# Patient Record
Sex: FEMALE | Race: WHITE | Employment: UNEMPLOYED | URBAN - METROPOLITAN AREA
[De-identification: names, ages, dates, MRNs, and addresses within clinical notes are randomized per-mention and may not be internally consistent; named-entity substitution may affect disease eponyms.]

---

## 2022-09-15 ENCOUNTER — TELEPHONE (OUTPATIENT)
Dept: FAMILY MEDICINE | Facility: CLINIC | Age: 47
End: 2022-09-15

## 2022-09-15 ENCOUNTER — OFFICE VISIT (OUTPATIENT)
Dept: FAMILY MEDICINE | Facility: CLINIC | Age: 47
End: 2022-09-15
Payer: COMMERCIAL

## 2022-09-15 VITALS
DIASTOLIC BLOOD PRESSURE: 92 MMHG | SYSTOLIC BLOOD PRESSURE: 148 MMHG | HEART RATE: 95 BPM | TEMPERATURE: 97.7 F | WEIGHT: 156 LBS | BODY MASS INDEX: 28.71 KG/M2 | OXYGEN SATURATION: 98 % | HEIGHT: 62 IN | RESPIRATION RATE: 14 BRPM

## 2022-09-15 DIAGNOSIS — I10 HYPERTENSION, UNSPECIFIED TYPE: Primary | ICD-10-CM

## 2022-09-15 DIAGNOSIS — Z13.220 SCREENING FOR HYPERLIPIDEMIA: ICD-10-CM

## 2022-09-15 DIAGNOSIS — R05.9 COUGH: ICD-10-CM

## 2022-09-15 LAB
ANION GAP SERPL CALCULATED.3IONS-SCNC: 3 MMOL/L (ref 3–14)
BUN SERPL-MCNC: 13 MG/DL (ref 7–30)
CALCIUM SERPL-MCNC: 9.3 MG/DL (ref 8.5–10.1)
CHLORIDE BLD-SCNC: 109 MMOL/L (ref 94–109)
CHOLEST SERPL-MCNC: 215 MG/DL
CO2 SERPL-SCNC: 29 MMOL/L (ref 20–32)
CREAT SERPL-MCNC: 0.63 MG/DL (ref 0.52–1.04)
ERYTHROCYTE [DISTWIDTH] IN BLOOD BY AUTOMATED COUNT: 12.6 % (ref 10–15)
FASTING STATUS PATIENT QL REPORTED: YES
GFR SERPL CREATININE-BSD FRML MDRD: >90 ML/MIN/1.73M2
GLUCOSE BLD-MCNC: 106 MG/DL (ref 70–99)
HCT VFR BLD AUTO: 41 % (ref 35–47)
HDLC SERPL-MCNC: 44 MG/DL
HGB BLD-MCNC: 13.8 G/DL (ref 11.7–15.7)
LDLC SERPL CALC-MCNC: 142 MG/DL
MCH RBC QN AUTO: 30.8 PG (ref 26.5–33)
MCHC RBC AUTO-ENTMCNC: 33.7 G/DL (ref 31.5–36.5)
MCV RBC AUTO: 92 FL (ref 78–100)
NONHDLC SERPL-MCNC: 171 MG/DL
PLATELET # BLD AUTO: 357 10E3/UL (ref 150–450)
POTASSIUM BLD-SCNC: 4.1 MMOL/L (ref 3.4–5.3)
RBC # BLD AUTO: 4.48 10E6/UL (ref 3.8–5.2)
SODIUM SERPL-SCNC: 141 MMOL/L (ref 133–144)
TRIGL SERPL-MCNC: 146 MG/DL
TSH SERPL DL<=0.005 MIU/L-ACNC: 1.47 MU/L (ref 0.4–4)
WBC # BLD AUTO: 7.1 10E3/UL (ref 4–11)

## 2022-09-15 PROCEDURE — 85027 COMPLETE CBC AUTOMATED: CPT | Performed by: PHYSICIAN ASSISTANT

## 2022-09-15 PROCEDURE — 99204 OFFICE O/P NEW MOD 45 MIN: CPT | Performed by: PHYSICIAN ASSISTANT

## 2022-09-15 PROCEDURE — 36415 COLL VENOUS BLD VENIPUNCTURE: CPT | Performed by: PHYSICIAN ASSISTANT

## 2022-09-15 PROCEDURE — 84443 ASSAY THYROID STIM HORMONE: CPT | Performed by: PHYSICIAN ASSISTANT

## 2022-09-15 PROCEDURE — 80048 BASIC METABOLIC PNL TOTAL CA: CPT | Performed by: PHYSICIAN ASSISTANT

## 2022-09-15 PROCEDURE — 80061 LIPID PANEL: CPT | Performed by: PHYSICIAN ASSISTANT

## 2022-09-15 RX ORDER — GUAIFENESIN 600 MG/1
1200 TABLET, EXTENDED RELEASE ORAL DAILY PRN
Qty: 20 TABLET | Refills: 0 | Status: SHIPPED | OUTPATIENT
Start: 2022-09-15 | End: 2023-10-16

## 2022-09-15 RX ORDER — LISINOPRIL AND HYDROCHLOROTHIAZIDE 20; 25 MG/1; MG/1
TABLET ORAL
Qty: 53 TABLET | Refills: 0 | Status: SHIPPED | OUTPATIENT
Start: 2022-09-15 | End: 2022-09-29 | Stop reason: SINTOL

## 2022-09-15 ASSESSMENT — PAIN SCALES - GENERAL: PAINLEVEL: NO PAIN (0)

## 2022-09-15 NOTE — PATIENT INSTRUCTIONS
Umang Lugo,    Thank you for allowing [facility] to manage your care.    Tera Yoo koj earlev mar michael necristiana txhais lub [place] ([place]), louis alonzoj [phone].  If you need a Vive Nano , please contact us at [phone].    Your blood pressure was high today. Get a blood pressure cuff for use at home. Take and record your blood pressure daily for 2 weeks. If your blood pressure is greater than or equal to 140/90mm Hg more than half of the time, please increase the dose as on the bottle to one tablet daily in 2 weeks. Otherwise, if t is less than 140/90mmHg, please stay on half of a tablet.    If you develop worsening/changing symptoms at any time, please call 911 or go to the emergency department for evaluation.    I ordered some lab work, please go to the laboratory to get your studies.    I sent your prescriptions to your pharmacy.    Please allow 1-2 business days for our office to contact you in regards to your laboratory/radiological studies.  If not done so, I encourage you to login into [employer] ([email or web address] to review your results as well.     Drink 8-10 glasses of fluid daily to stay well-hydrated.    If you have any questions or concerns, please feel free to call us at [phone]    Sincerely,    Jj Borges PA-C    Did you know?      You can schedule a video visit for follow-up appointments as well as future appointments for certain conditions.  Please see the below link.     [email or web address]    If you have not already done so,  I encourage you to sign up for [employer] ([email or web address]).  This will allow you to review your results, securely communicate with a provider, and schedule virtual visits as well.

## 2022-09-15 NOTE — TELEPHONE ENCOUNTER
Reason for Call:  Other     Detailed comments:  Patient wants her iron to be order in her labs today.    Phone Number Patient can be reached at: Home number on file [phone] (home)    Best Time:  ASAP    Can we leave a detailed message on this number? YES    Call taken on 9/15/2022 at 9:59 AM by Sandra Gage

## 2022-09-15 NOTE — LETTER
September 16, 2022      Parish WILY Larsen  [address] [address]        Dear ,    We are writing to inform you of your test results.    Your test results are reassuring, though your cholesterol and glucose are mildly elevated. We do not need to start any medications right now, but please discuss this with your primary when you see them.    Resulted Orders   CBC with platelets   Result Value Ref Range    WBC Count 7.1 4.0 - 11.0 10e3/uL    RBC Count 4.48 3.80 - 5.20 10e6/uL    Hemoglobin 13.8 11.7 - 15.7 g/dL    Hematocrit 41.0 35.0 - 47.0 %    MCV 92 78 - 100 fL    MCH 30.8 26.5 - 33.0 pg    MCHC 33.7 31.5 - 36.5 g/dL    RDW 12.6 10.0 - 15.0 %    Platelet Count 357 150 - 450 10e3/uL   Basic metabolic panel  (Ca, Cl, CO2, Creat, Gluc, K, Na, BUN)   Result Value Ref Range    Sodium 141 133 - 144 mmol/L    Potassium 4.1 3.4 - 5.3 mmol/L    Chloride 109 94 - 109 mmol/L    Carbon Dioxide (CO2) 29 20 - 32 mmol/L    Anion Gap 3 3 - 14 mmol/L    Urea Nitrogen 13 7 - 30 mg/dL    Creatinine 0.63 0.52 - 1.04 mg/dL    Calcium 9.3 8.5 - 10.1 mg/dL    Glucose 106 (H) 70 - 99 mg/dL    GFR Estimate >90 >60 mL/min/1.73m2      Comment:      Effective December 21, 2021 eGFRcr in adults is calculated using the 2021 CKD-EPI creatinine equation which includes age and gender (Timothy huerta al., [facility], DOI: 10.1056/LMDYga0881591)   TSH with free T4 reflex   Result Value Ref Range    TSH 1.47 0.40 - 4.00 mU/L   Lipid panel reflex to direct LDL Non-fasting   Result Value Ref Range    Cholesterol 215 (H) <200 mg/dL    Triglycerides 146 <150 mg/dL    Direct Measure HDL 44 (L) >=50 mg/dL    LDL Cholesterol Calculated 142 (H) <=100 mg/dL    Non HDL Cholesterol 171 (H) <130 mg/dL    Patient Fasting > 8hrs? Yes     Narrative    Cholesterol  Desirable:  <200 mg/dL    Triglycerides  Normal:  Less than 150 mg/dL  Borderline High:  150-199 mg/dL  High:  200-499 mg/dL  Very High:  Greater than or equal to 500 mg/dL    Direct Measure  HDL  Female:  Greater than or equal to 50 mg/dL   Male:  Greater than or equal to 40 mg/dL    LDL Cholesterol  Desirable:  <100mg/dL  Above Desirable:  100-129 mg/dL   Borderline High:  130-159 mg/dL   High:  160-189 mg/dL   Very High:  >= 190 mg/dL    Non HDL Cholesterol  Desirable:  130 mg/dL  Above Desirable:  130-159 mg/dL  Borderline High:  160-189 mg/dL  High:  190-219 mg/dL  Very High:  Greater than or equal to 220 mg/dL       If you have any questions or concerns, please call the clinic at the number listed above.       Sincerely,      KUN Ambriz

## 2022-09-15 NOTE — PROGRESS NOTES
"  Assessment & Plan   Problem List Items Addressed This Visit    None     Visit Diagnoses     Hypertension, unspecified type    -  Primary    Relevant Medications    lisinopril-hydrochlorothiazide (ZESTORETIC) 20-25 MG tablet    Other Relevant Orders    TSH with free T4 reflex    Basic metabolic panel  (Ca, Cl, CO2, Creat, Gluc, K, Na, BUN)    CBC with platelets (Completed)    Home Blood Pressure Monitor Order for DME - ONLY FOR DME    Screening for hyperlipidemia        Relevant Orders    Lipid panel reflex to direct LDL Non-fasting    Cough        Relevant Medications    guaiFENesin (MUCINEX) 600 MG 12 hr tablet         Likely new onset HTN. Low suspicion for end organ damage. Asymptomatic currently. Did not tolerate amlodipine. Will trial low dose Zestoretic with ramp up prn, though second bp today was only mildly elevated. CBC shows no anemia. TSH and chem are pending.    Complete history and physical exam as below. AF with normal VS except for elevated bp, which they will monitor at home and contact us if >140/90mmHg greater than 50% of the time.    DDx and Dx discussed with and explained to the pt to their satisfaction.  All questions were answered at this time. Pt expressed understanding of and agreement with this dx, tx, and plan. No further workup warranted and standard medication warnings given. I have given the patient a list of pertinent indications for re-evaluation. Will go to the Emergency Department if symptoms worsen or new concerning symptoms arise. Patient left in no apparent distress.     Ordering of each unique test  Prescription drug management  45 minutes spent on the date of the encounter doing chart review, history and exam, documentation and further activities per the note     BMI:   Estimated body mass index is 28.89 kg/m  as calculated from the following:    Height as of this encounter: 1.565 m (5' 1.61\").    Weight as of this encounter: 70.8 kg (156 lb).     See Patient " Instructions    Return in about 1 month (around 10/15/2022) for a recheck with your primary care provider, or call 911/go to an ER anytime if worsening.    KUN Ambriz  [facility] [place] Lugo is a 47 year old accompanied by her daughter, presenting for the following health issues:  Hypertension and Hospital F/U      HPI     Hospital Follow-up Visit:  [facility]  used.  Hospital/Nursing Home/IP Rehab Facility: unknown per patient, ER at [place]  Date of Admission: 8/24/22  Date of Discharge: 8/24/22  Reason(s) for Admission: HTN    Was your hospitalization related to COVID-19? No   Problems taking medications regularly:  None  Medication changes since discharge: N/A  Problems adhering to non-medication therapy:  None    Summary of hospitalization:  CareEverywhere information obtained and reviewed    Seen in urgent care on 8/19/22 for dizziness and elevated bp and told to go to ER. Did not go to the ER. Went to a different urgent care on 8/24/22 and had an EKG    3 weeks ago had dizziness and near syncope while driving. Pulled over. Needs a new primary provider. Was told to follow up in primary care and was started on 5mg of amlodipine. This was not helpful. She had difficulty sleeping while on this medication. She continues to feel tightness in her shoulders and occasionally in her chest along with dizziness. Chest discomfort has resolved. Comprehensive panel and EKG were not concerning during the second visit on 8/24/22. Feels jittery and dizziness as if she needs to eat something.     Diagnostic Tests/Treatments reviewed.  Follow up needed: needs to establish primary.  Other Healthcare Providers Involved in Patient s Care:         None  Update since discharge: fluctuating course.     Review of Systems   Constitutional, HEENT, cardiovascular, pulmonary, heme, endo, msk, derm, neuro, psych,  gi and gu systems are negative, except as otherwise noted.     "  Objective    BP (!) 148/92   Pulse 95   Temp 97.7  F (36.5  C) (Tympanic)   Resp 14   Ht 1.565 m (5' 1.61\")   Wt 70.8 kg (156 lb)   SpO2 98%   BMI 28.89 kg/m    Body mass index is 28.89 kg/m .  Physical Exam  Vitals and nursing note reviewed.   Constitutional:       General: She is not in acute distress.     Appearance: She is not ill-appearing or diaphoretic.   HENT:      Head: Normocephalic and atraumatic.      Mouth/Throat:      Mouth: Mucous membranes are moist.   Eyes:      Conjunctiva/sclera: Conjunctivae normal.   Cardiovascular:      Rate and Rhythm: Normal rate and regular rhythm.      Heart sounds: Normal heart sounds. No murmur heard.    No friction rub. No gallop.   Pulmonary:      Effort: Pulmonary effort is normal. No respiratory distress.      Breath sounds: Normal breath sounds. No stridor. No wheezing, rhonchi or rales.   Abdominal:      General: Bowel sounds are normal. There is no distension.      Palpations: Abdomen is soft. There is no mass.      Tenderness: There is no abdominal tenderness. There is no guarding or rebound.      Hernia: No hernia is present.   Skin:     General: Skin is warm and dry.   Neurological:      General: No focal deficit present.      Mental Status: She is alert. Mental status is at baseline.      Comments: Negative finger-nose-finger, heel-to-shin and pronator drift.  Face symmetric.  Speech clear. CN 2-12 intact. Normal strength and sensation in face and upper/lower extremities bilaterally.   Psychiatric:         Mood and Affect: Mood normal.         Behavior: Behavior normal.        Results for orders placed or performed in visit on 09/15/22   CBC with platelets     Status: Normal   Result Value Ref Range    WBC Count 7.1 4.0 - 11.0 10e3/uL    RBC Count 4.48 3.80 - 5.20 10e6/uL    Hemoglobin 13.8 11.7 - 15.7 g/dL    Hematocrit 41.0 35.0 - 47.0 %    MCV 92 78 - 100 fL    MCH 30.8 26.5 - 33.0 pg    MCHC 33.7 31.5 - 36.5 g/dL    RDW 12.6 10.0 - 15.0 %    " Platelet Count 357 150 - 450 10e3/uL

## 2022-09-15 NOTE — LETTER
September 15, 2022      Parish Larsen  [address] [address]        To Whom It May Concern:    Parish Larsen was seen in our clinic. Please excuse her absence.      Sincerely,        KUN Ambriz

## 2022-09-19 ENCOUNTER — TELEPHONE (OUTPATIENT)
Dept: FAMILY MEDICINE | Facility: CLINIC | Age: 47
End: 2022-09-19

## 2022-09-19 NOTE — TELEPHONE ENCOUNTER
Pt sister calling asking about medication.  NO consent to communicate on file.    She reports sister started taking her medication yesterday.  She reports feeling burning hot, and cannot sleep and is tired in the morning.  Sister says pt had same reaction for amlodipine.      They are wondering if she should continue taking or start something different.     (aslo advised establish care visit)    Please call pt after 3 pm    Мария Ayala RN

## 2022-09-19 NOTE — TELEPHONE ENCOUNTER
If she is having a reaction, she should stop the medication and make an appointment to establish care with a primary and discuss other bp meds. If she worsens/changes as far as the reaction is concerned, she should be seen in urgent care/ER. Please update.

## 2022-09-19 NOTE — TELEPHONE ENCOUNTER
Attempted to call patient via [place]  at [phone].  Per  he tried number x 2.  Both times phone went silent.   stated sometimes this happen's with land lines with no voicemail.  Will need to try again tomorrow.   Statement Selected

## 2022-09-20 ENCOUNTER — NURSE TRIAGE (OUTPATIENT)
Dept: NURSING | Facility: CLINIC | Age: 47
End: 2022-09-20

## 2022-09-20 NOTE — TELEPHONE ENCOUNTER
Called [phone] (home) using  services    Did patient answer the phone: No,there was no answer or voicemail.    Luisa RN,BSN  Triage Nurse  [facility]: [facility]

## 2022-09-20 NOTE — TELEPHONE ENCOUNTER
"Nurse Triage SBAR    Situation:   Medication reaction    Background:   Parish is calling in with her son and , It is okay to leave a detailed message at this number.   She  wants her son to act as an  - denied other phone  at this time  - She has given verbal consent to communicate with them.    Assessment:   Parish was seen on 9/15/22 and was prescribed two new medications.  She said the Mucinex is working fine, but the Zestoretic is causing symptoms:  -She feels warm, no fever - they do not have a thermometer  to check her temperature - RN recommended teem to buy one and to call back if she  Has fever)   -Hard to sleep  -No chest pain   -No difficulty breathing  -No rash  -No difficultly swallowing ing or throat swelling  -No lightheadedness or dizziness  -When ever she takes the medication she hears a thumping sound in her ears she dose not know if its her heart beat  -With the pill she develops shoulder and neck pain with taking the medication and pain on her side  -She also gets get shivers form the medications    They called yesterday with similar problems, Per JACKIE Borges:  \"If she is having a reaction, she should stop the medication and make an appointment to establish care with a primary and discuss other bp meds. If she worsens/changes as far as the reaction is concerned, she should be seen in urgent care/ER. Please update.\"    -We discussed when to call back and when to go to the ED.    If you need to call her back it is best to call after 4PM.    Protocol Recommended Disposition:   Home Care    Recommendation:   Follow Jony recommendations and call the FNA back if you have additional questions or concerns.    NABIL PERALTA RN on 9/20/2022 at 4:25 PM      Reason for Disposition    [1] Fever AND [2] no signs of serious infection or localizing symptoms (all other triage questions negative)    Additional Information    Negative: Drug overdose and triager unable to answer " question    Negative: Caller requesting a renewal or refill of a medicine patient is currently taking    Negative: Caller requesting information unrelated to medicine    Negative: Caller requesting information about COVID-19 Vaccine    Negative: Caller requesting information about Emergency Contraception    Negative: Caller requesting information about Combined Birth Control Pills    Negative: Caller requesting information about Progestin Birth Control Pills    Negative: Caller requesting information about Post-Op pain or medicines    Negative: Caller requesting a prescription antibiotic (such as penicillin) for Strep throat and has a positive culture result    Negative: Caller requesting a prescription anti-viral med (such as Tamiflu) and has influenza (flu) symptoms    Negative: Immunization reaction suspected    Negative: Rash while taking a medicine or within 3 days of stopping it    Negative: Symptom of illness (e.g., headache, abdominal pain, earache, vomiting) that are more than mild    Negative: Breastfeeding questions about mother's medicines and diet    Negative: Asthma and having symptoms of asthma (cough, wheezing, etc.)    Negative: New-onset rash with multiple purple (or blood-colored) spots or dots    Negative: Sounds like a life-threatening emergency to the triager    Negative: [1] Difficulty breathing AND [2] bluish lips, tongue or face    Negative: Difficult to awaken or acting confused (e.g., disoriented, slurred speech)    Negative: Pregnant    Negative: Fever in a cancer patient who is currently (or recently) receiving chemotherapy or radiation therapy, or cancer patient who has metastatic or end-stage cancer and is receiving palliative care    Negative: Postpartum (from 0 to 6 weeks after delivery)    Negative: Shock suspected (e.g., cold/pale/clammy skin, too weak to stand, low BP, rapid pulse)    Negative: Other symptom is present, see that guideline (e.g., symptoms of cough, runny nose, sore  throat, earache, abdominal pain, diarrhea, vomiting)    Negative: Fever onset within 24 hours of receiving vaccine    Negative: [1] Fever AND [2] within 14 days of COVID-19 Exposure    Negative: Difficulty breathing    Negative: Widespread rash and cause unknown    Negative: Patient sounds very sick or weak to the triager  (Exception: mild weakness and hasn't taken fever medicine)    Negative: Fever > 104 F (40 C)    Negative: [1] Fever > 101 F (38.3 C) AND [2] age > 60 years    Negative: [1] Fever > 100.0 F (37.8 C) AND [2] bedridden (e.g., nursing home patient, CVA, chronic illness, recovering from surgery)    Negative: [1] Fever > 100.0 F (37.8 C) AND [2] indwelling urinary catheter (e.g., Watkins, Coude)    Negative: [1] Fever > 100.0 F (37.8 C) AND [2] has port (portacath), central line, or PICC line    Negative: [1] Fever > 100.0 F (37.8 C) AND [2] diabetes mellitus or weak immune system (e.g., HIV positive, cancer chemo, splenectomy, organ transplant, chronic steroids)    Negative: [1] Fever > 100.0 F (37.8 C) AND [2] surgery in the last month    Negative: [1] Headache AND [2] stiff neck (can't touch chin to chest)    Negative: IV Drug Use (IVDU)    Negative: [1] Drinking very little AND [2] dehydration suspected (e.g., no urine > 12 hours, very dry mouth, very lightheaded)    Negative: [1] Fever > 100.0 F (37.8 C) AND [2] foreign travel to a developing country in the last month    Negative: Transplant patient (e.g., kidney, liver, lung, heart)    Negative: [1] Intermittent fever > 100.0 F (37.8 C) AND [2] lasts > 3 weeks    Negative: Fever present > 3 days (72 hours)    Negative: Severe chills (i.e., feeling extremely cold WITH shaking chills)    Protocols used: FEVER-A-AH, MEDICATION QUESTION CALL-A-OH

## 2022-09-21 NOTE — TELEPHONE ENCOUNTER
Called [phone].  The message stated that the person you are calling is unavailable, please try your call again later.    Luisa RN,BSN  Triage Nurse  [facility]: [facility]  Ph: [phone]

## 2022-09-26 NOTE — TELEPHONE ENCOUNTER
Called [phone] (home).  The message stated that the wireless caller you are calling is unavailable.  Please try your call again later.    Luisa RN,BSN  Triage Nurse  [facility]: [facility]  Ph: [phone]

## 2022-09-26 NOTE — TELEPHONE ENCOUNTER
She is scheduled to establish care here at [place] in 3 days, med reaction is part of the visit note.    Myriam Wall RN  [facility]

## 2022-09-29 ENCOUNTER — OFFICE VISIT (OUTPATIENT)
Dept: FAMILY MEDICINE | Facility: CLINIC | Age: 47
End: 2022-09-29
Payer: COMMERCIAL

## 2022-09-29 VITALS
BODY MASS INDEX: 27.79 KG/M2 | DIASTOLIC BLOOD PRESSURE: 98 MMHG | TEMPERATURE: 97.2 F | SYSTOLIC BLOOD PRESSURE: 148 MMHG | OXYGEN SATURATION: 97 % | HEART RATE: 96 BPM | WEIGHT: 151 LBS | HEIGHT: 62 IN | RESPIRATION RATE: 16 BRPM

## 2022-09-29 DIAGNOSIS — G44.209 TENSION HEADACHE: ICD-10-CM

## 2022-09-29 DIAGNOSIS — R73.03 PREDIABETES: ICD-10-CM

## 2022-09-29 DIAGNOSIS — R42 DIZZINESS: Primary | ICD-10-CM

## 2022-09-29 DIAGNOSIS — I10 HYPERTENSION GOAL BP (BLOOD PRESSURE) < 140/90: ICD-10-CM

## 2022-09-29 PROCEDURE — 99215 OFFICE O/P EST HI 40 MIN: CPT | Performed by: PHYSICIAN ASSISTANT

## 2022-09-29 RX ORDER — LISINOPRIL 10 MG/1
10 TABLET ORAL DAILY
Qty: 30 TABLET | Refills: 1 | Status: SHIPPED | OUTPATIENT
Start: 2022-09-29 | End: 2022-10-27

## 2022-09-29 RX ORDER — CYCLOBENZAPRINE HCL 10 MG
10 TABLET ORAL 2 TIMES DAILY PRN
Qty: 30 TABLET | Refills: 0 | Status: SHIPPED | OUTPATIENT
Start: 2022-09-29

## 2022-09-29 ASSESSMENT — PAIN SCALES - GENERAL: PAINLEVEL: NO PAIN (0)

## 2022-09-29 NOTE — LETTER
[facility]  [address]  [place] [address]  Phone: [phone]        9/29/2022    Parish Larsen  [address]  [place] [address]            To Whom it May Concern:    This patient missed work 9/29/2022 due to illness.    Please contact me for questions or concerns.    Sincerely,    Sydney Nair PA-C

## 2022-09-29 NOTE — PROGRESS NOTES
Assessment & Plan     Dizziness  Symptoms are relatively new. Initially, I thought symptoms may be from low pressures due to overmedication/med side effects.  However she has been off BP medication for 2 weeks and symptoms still present.  Neurological exam normal today which is reassuring.  [place] Hallpike test negative, therefore unlikely positional vertigo.   Previous labs were normal (stable hemoglobin).   Further evaluation with an MRI indicated.   Symptoms may be part of a stress reaction, however will first check an MRI to ensure nothing more significant is going on as this is a relatively new symptom for her.     - MR Brain w/o & w Contrast; Future    Hypertension goal BP (blood pressure) < 140/90    I do suspect that the two previous meds caused side effects and/or were a little too high of doses for her.  BP is elevated but not currently in a dangerous or urgent range, therefore will start low and slowly taper up.   - Albumin Random Urine Quantitative with Creat Ratio; Future  - lisinopril (ZESTRIL) 10 MG tablet; Take 1 tablet (10 mg) by mouth daily    Prediabetes    We discussed her last blood sugar level, and that this puts her in a pre-diabetic range.  We discussed ways to lower her risk of developing diabetes through diet and exercise, such as eating a healthy diet high in lean protein, whole grain carbohydrates and choosing healthy fats such as olive oil or canola oil.   Will recheck level in 12 months.    Reassure patient this is not contributing to current symptoms.       Tension headache  Tension Headache    Medication per .    Patient was given educational materials on tension headaches.  Patient advised to do ROM exercises 2-3 times per day and apply heat to the neck as needed (patient warned not to sleep with heating pad on).  Follow-up if headache persists despite these treatments, or if symptoms change.     I warned Parish Larsen that flexeril may cause drowsiness and she is not to  "drive or operate heavy machinery after taking this medication.     - cyclobenzaprine (FLEXERIL) 10 MG tablet; Take 1 tablet (10 mg) by mouth 2 times daily as needed for other (tension headache)      50 minutes spent on the date of the encounter doing chart review, history and exam, documentation and further activities per the note       BMI:   .  Estimated body mass index is 27.97 kg/m  as calculated from the following:    Height as of this encounter: 1.565 m (5' 1.61\").    Weight as of this encounter: 68.5 kg (151 lb).           Return in about 4 weeks (around 10/27/2022).    ELLIE Gayle [facility] [place] is a 47 year old accompanied by her sister and , presenting for the following health issues:  Allergic Reaction      HPI     Patient arrived to discuss cocerns with Blood Pressure medication lisinopril-hydrochlorothiazide (ZESTORETIC) 20-25 MG tablet. Patient reports side effects of; drowsiness, headaches that radiate into neck and difficulty sleeping.  Patient no longer taking bp medication (about 2 weeks ago) would like to discuss alternative medication.     **Pt also wanting to know if there is time to fill out [place] paperwork.  She has been missing work due to lightheaded sensation and dizziness.  No LOC.  Sitting helps the dizziness go away.  Occurs a couple times per week.  She works 10 hours per day and she is sitting doing .  She is concerned she may lose her job due to these symptoms.  She does get these symptoms at home as well.  Sometimes will occur 3-4 times per day other times it will come on 2-3 times per week.      **Pt also wanting IUD removed. Believes it is the Mirena form but is unsure, has been around 5 years since placement.     She c/o tension headaches.  Pain stems in the neck and radiates into head. She also c/o headaches and dizziness.     Near end of exam, sister mentioned that there has been more stress in the " "family as a loved one's health is not well.  They wonder if some of these symptoms are related to worry.       Hypertension Follow-up  Burning, head into neck, especially at night.     Do you check your blood pressure regularly outside of the clinic? Yes - Monitoring at home.     Are you following a low salt diet? Yes    Are your blood pressures ever more than 140 on the top number (systolic) OR more   than 90 on the bottom number (diastolic), for example 140/90? Yes, Systolic number averaging around 130-150.       How many servings of fruits and vegetables do you eat daily?  2-3    On average, how many sweetened beverages do you drink each day (Examples: soda, juice, sweet tea, etc.  Do NOT count diet or artificially sweetened beverages)?   0-1    How many days per week do you exercise enough to make your heart beat faster? 3 or less    How many minutes a day do you exercise enough to make your heart beat faster? 9 or less    How many days per week do you miss taking your medication? 0        Review of Systems   Constitutional, HEENT, cardiovascular, pulmonary, GI, , musculoskeletal, neuro, skin, endocrine and psych systems are negative, except as otherwise noted.      Objective    BP (!) 148/98   Pulse 96   Temp 97.2  F (36.2  C) (Tympanic)   Resp 16   Ht 1.565 m (5' 1.61\")   Wt 68.5 kg (151 lb)   SpO2 97%   Breastfeeding No   BMI 27.97 kg/m    Body mass index is 27.97 kg/m .  Physical Exam   GENERAL: healthy, alert and no distress  EYES: Eyes grossly normal to inspection, PERRL and conjunctivae and sclerae normal  HENT: ear canals and TM's normal, nose and mouth without ulcers or lesions  NECK: no adenopathy, no asymmetry, masses, or scars and thyroid normal to palpation  RESP: lungs clear to auscultation - no rales, rhonchi or wheezes  CV: regular rate and rhythm, normal S1 S2, no S3 or S4, no murmur, click or rub, no peripheral edema and peripheral pulses strong  ABDOMEN: soft, nontender, no " hepatosplenomegaly, no masses and bowel sounds normal  MS: no gross musculoskeletal defects noted, no edema    Neurological Examination:  Mental Status:  Alert and oriented to time, place, and person.  Recent and remote memory intact.  Attention span and concentration normal.  Adequate fund of knowledge.  Speech:  Normal  Cranial Nerves:  Cranial Nerve #2: Visual acuity normal to finger counting.  Pupils equal and reacting to light and to accomodation.   Cranial #3, 4, 6:  Eye movements normal in all directions of gaze  Cranial #5:   Motor function normal.  Cranial #7: Face symmetrical in appearance and movement.  Cranial #8: Normal hearing to whispered sounds.  Cranial #9 & 10: Normal palate and uvula movement  Cranial #11:  Shoulder shrug symmetrical  Cranial #12:  Tongue midline with normal movements.  Motor:  Tone:  Normal in both upper and lower limbs.  Bulk:  Normal in both upper and lower limbs  Power: No drift of the outstretched hands.  Normal strength in all muscle groups (5/5) of both upper and lower limbs.  Coordination:   heel-shin test normal bilaterally  Reflexes: Deep tendon reflexes 2+ and symmetrical both sides in upper and lower extremities.  Gait:  Walk with a normal stride length and normal arm swing.  Normal tandem walking.  Can stand/walk on heels and toes.  Romberg sign: Negative.  [place] Hallpike test negative.           No results found for any visits on 09/29/22.

## 2022-09-29 NOTE — PATIENT INSTRUCTIONS
I suggest we start a very low dose lisinopril for blood pressure.     For your tension headache, I suggest you use flexeril (muscle relaxer) as needed.  This may cause drowsiness.    Heat, massage and stretching important.     Please call [facility] at [phone]  to set up the MRI of the brain.

## 2022-10-01 PROBLEM — G44.209 TENSION HEADACHE: Status: ACTIVE | Noted: 2022-10-01

## 2022-10-01 PROBLEM — R73.03 PREDIABETES: Status: ACTIVE | Noted: 2022-10-01

## 2022-10-01 PROBLEM — R42 DIZZINESS: Status: ACTIVE | Noted: 2022-10-01

## 2022-10-01 PROBLEM — I10 HYPERTENSION GOAL BP (BLOOD PRESSURE) < 140/90: Status: ACTIVE | Noted: 2022-10-01

## 2022-10-04 ENCOUNTER — TELEPHONE (OUTPATIENT)
Dept: FAMILY MEDICINE | Facility: CLINIC | Age: 47
End: 2022-10-04

## 2022-10-04 NOTE — TELEPHONE ENCOUNTER
Forms/Letter Request    Type of form/letter: Work. Needs letter stating she cannot work any OT. Fax to ANA Chaya Amita [phone] phone is [phone] email is [email or web address]    Have you been seen for this request: Yes     Do we have the form/letter: No    When is form/letter needed by: asap    How would you like the form/letter returned: Fax    Patient Notified form requests are processed in 3-5 business days:Yes    Okay to leave a detailed message?: Yes at Home number on file [phone] (home) or sister Angelica Levi [phone]

## 2022-10-06 ENCOUNTER — ANCILLARY PROCEDURE (OUTPATIENT)
Dept: MRI IMAGING | Facility: CLINIC | Age: 47
End: 2022-10-06
Attending: PHYSICIAN ASSISTANT
Payer: COMMERCIAL

## 2022-10-06 DIAGNOSIS — R42 DIZZINESS: ICD-10-CM

## 2022-10-06 PROCEDURE — 70553 MRI BRAIN STEM W/O & W/DYE: CPT | Mod: TC | Performed by: RADIOLOGY

## 2022-10-06 PROCEDURE — A9585 GADOBUTROL INJECTION: HCPCS | Mod: JW | Performed by: RADIOLOGY

## 2022-10-06 RX ORDER — GADOBUTROL 604.72 MG/ML
6.8 INJECTION INTRAVENOUS ONCE
Status: COMPLETED | OUTPATIENT
Start: 2022-10-06 | End: 2022-10-06

## 2022-10-06 RX ADMIN — GADOBUTROL 6.8 ML: 604.72 INJECTION INTRAVENOUS at 17:16

## 2022-10-10 ENCOUNTER — TELEPHONE (OUTPATIENT)
Dept: FAMILY MEDICINE | Facility: CLINIC | Age: 47
End: 2022-10-10

## 2022-10-10 NOTE — TELEPHONE ENCOUNTER
Called patient using  services.  There was no answer, or voicemail set up.  Will try again later.    Luisa RN,BSN  Triage Nurse  [facility]: [facility]  Ph: [phone]

## 2022-10-10 NOTE — TELEPHONE ENCOUNTER
----- Message from Sydney Nair PA-C sent at 10/10/2022  7:23 AM CDT -----  Please call with an ,    Her MRI of the brain is normal.  We'll check back in at her follow-up visit on Oct 27th, 2022.     Please follow-up if you have any questions or concerns.    Sincerely,    Sydney Nair PA-C

## 2022-10-11 NOTE — TELEPHONE ENCOUNTER
Called using  [phone] (home) person is not available, can not leave a message.  Called [phone],  The voicemail stated that the person you are calling is not available, please try your call again later.         Please mail the below results and message from PCP.    Luisa RN,BSN  Triage Nurse  [facility]: [facility]

## 2022-10-27 ENCOUNTER — OFFICE VISIT (OUTPATIENT)
Dept: FAMILY MEDICINE | Facility: CLINIC | Age: 47
End: 2022-10-27
Payer: COMMERCIAL

## 2022-10-27 ENCOUNTER — TELEPHONE (OUTPATIENT)
Dept: FAMILY MEDICINE | Facility: CLINIC | Age: 47
End: 2022-10-27

## 2022-10-27 VITALS
RESPIRATION RATE: 16 BRPM | SYSTOLIC BLOOD PRESSURE: 139 MMHG | WEIGHT: 150 LBS | DIASTOLIC BLOOD PRESSURE: 87 MMHG | HEART RATE: 84 BPM | HEIGHT: 62 IN | TEMPERATURE: 97.6 F | BODY MASS INDEX: 27.6 KG/M2 | OXYGEN SATURATION: 99 %

## 2022-10-27 DIAGNOSIS — Z00.00 ROUTINE GENERAL MEDICAL EXAMINATION AT A HEALTH CARE FACILITY: Primary | ICD-10-CM

## 2022-10-27 DIAGNOSIS — E87.6 HYPOKALEMIA: ICD-10-CM

## 2022-10-27 DIAGNOSIS — B37.31 VAGINAL CANDIDIASIS: ICD-10-CM

## 2022-10-27 DIAGNOSIS — F32.9 REACTIVE DEPRESSION (SITUATIONAL): ICD-10-CM

## 2022-10-27 DIAGNOSIS — H53.8 BLURRED VISION: ICD-10-CM

## 2022-10-27 DIAGNOSIS — Z12.31 VISIT FOR SCREENING MAMMOGRAM: ICD-10-CM

## 2022-10-27 DIAGNOSIS — Z12.11 SCREEN FOR COLON CANCER: ICD-10-CM

## 2022-10-27 DIAGNOSIS — Z97.5 IUD (INTRAUTERINE DEVICE) IN PLACE: ICD-10-CM

## 2022-10-27 DIAGNOSIS — I10 HYPERTENSION GOAL BP (BLOOD PRESSURE) < 140/90: ICD-10-CM

## 2022-10-27 DIAGNOSIS — Z12.4 CERVICAL CANCER SCREENING: ICD-10-CM

## 2022-10-27 LAB
CLUE CELLS: ABNORMAL
TRICHOMONAS, WET PREP: ABNORMAL
WBC'S/HIGH POWER FIELD, WET PREP: ABNORMAL
YEAST, WET PREP: PRESENT

## 2022-10-27 PROCEDURE — 83540 ASSAY OF IRON: CPT | Performed by: PHYSICIAN ASSISTANT

## 2022-10-27 PROCEDURE — G0145 SCR C/V CYTO,THINLAYER,RESCR: HCPCS | Performed by: PHYSICIAN ASSISTANT

## 2022-10-27 PROCEDURE — 87210 SMEAR WET MOUNT SALINE/INK: CPT | Performed by: PHYSICIAN ASSISTANT

## 2022-10-27 PROCEDURE — 80048 BASIC METABOLIC PNL TOTAL CA: CPT | Performed by: PHYSICIAN ASSISTANT

## 2022-10-27 PROCEDURE — 83550 IRON BINDING TEST: CPT | Performed by: PHYSICIAN ASSISTANT

## 2022-10-27 PROCEDURE — 36415 COLL VENOUS BLD VENIPUNCTURE: CPT | Performed by: PHYSICIAN ASSISTANT

## 2022-10-27 PROCEDURE — 87624 HPV HI-RISK TYP POOLED RSLT: CPT | Performed by: PHYSICIAN ASSISTANT

## 2022-10-27 PROCEDURE — 99396 PREV VISIT EST AGE 40-64: CPT | Performed by: PHYSICIAN ASSISTANT

## 2022-10-27 PROCEDURE — 82043 UR ALBUMIN QUANTITATIVE: CPT | Performed by: PHYSICIAN ASSISTANT

## 2022-10-27 PROCEDURE — 99213 OFFICE O/P EST LOW 20 MIN: CPT | Mod: 25 | Performed by: PHYSICIAN ASSISTANT

## 2022-10-27 RX ORDER — LISINOPRIL 10 MG/1
10 TABLET ORAL DAILY
Qty: 90 TABLET | Refills: 1 | Status: SHIPPED | OUTPATIENT
Start: 2022-10-27 | End: 2023-07-10

## 2022-10-27 RX ORDER — FLUCONAZOLE 150 MG/1
150 TABLET ORAL ONCE
Qty: 1 TABLET | Refills: 0 | Status: SHIPPED | OUTPATIENT
Start: 2022-10-27 | End: 2022-10-27

## 2022-10-27 NOTE — TELEPHONE ENCOUNTER
----- Message from Sydney Nair PA-C sent at 10/27/2022  2:31 PM CDT -----  Please call Song (with ),   Her wet prep was positive for a yeast infection (which can cause a thick white vaginal discharge).   I have sent a medication called diflucan, take this x 1 dose.   Also, please relay to Song that the IUD she had in place was in fact a 5 year Kyleena (not the Mirena as I expected before her exam).  This should be replaced at the 5 year andrzej, she can schedule this with me soon.    Sydney Nair PA-C

## 2022-10-27 NOTE — LETTER
[facility]  [address] [place] [address]  Phone: [phone]    October 27, 2022        Parish Larsen  [address] [address]          To whom it may concern:    RE: Parish WILY Chava    Patient was seen and treated today at our clinic and missed work.    Please contact me for questions or concerns.      Sincerely,        Sydney Nair PA-C

## 2022-10-27 NOTE — PROGRESS NOTES
SUBJECTIVE:   CC: Parish is an 47 year old who presents with son (Cameron) and  for preventive health visit.       Patient has been advised of split billing requirements and indicates understanding: Yes     History of Present Illness       Hypertension: She presents for follow up of hypertension.  She does check blood pressure  regularly outside of the clinic. Outpatient blood pressures have not been over 140/90. She follows a low salt diet.    She is not taking prescribed medications regularly due to None.  Healthy Habits:    Getting at least 3 servings of Calcium per day:  Yes    Bi-annual eye exam:  NO    Dental care twice a year:  NO    Sleep apnea or symptoms of sleep apnea:  None    Diet:  Other (Avoiding beef)    Frequency of exercise:  None    Duration of exercise:  N/A    Taking medications regularly:  Yes    Barriers to taking medications:  None    Medication side effects:  None    PHQ-2 Total Score:    Additional concerns today:  Yes (Following up with blood pressures and IUD Check)    Patient arrived for Annual Physical; due for PAP and would like IUD checked. Would also like to have labs drawn if possible, not fasting. Also has concerns with symptoms of dizziness and shakiness, pt is prediabetic unsure if related but pt has concerns with vertigo.     Dizziness has resolved.  She has had high stress levels ( has been sick for 2 years).   Still feels a little shaky and she feels some haziness in her vision.     No recent eye exam.  Would like to get this done at the eye specialist her children use.           Hypertension Follow-up      Do you check your blood pressure regularly outside of the clinic? No     Are you following a low salt diet? No    Are your blood pressures ever more than 140 on the top number (systolic) OR more   than 90 on the bottom number (diastolic), for example 140/90? No      Today's PHQ-2 Score:   PHQ-2 ( 1999 [employer]) 9/29/2022   Q1: Little interest or pleasure in  doing things 1   Q2: Feeling down, depressed or hopeless 1   PHQ-2 Score 2   Q1: Little interest or pleasure in doing things Several days   Q2: Feeling down, depressed or hopeless Several days   PHQ-2 Score 2       Abuse: Current or Past (Physical, Sexual or Emotional) - No  Do you feel safe in your environment? Yes    Have you ever done Advance Care Planning? (For example, a Health Directive, POLST, or a discussion with a medical provider or your loved ones about your wishes): No, advance care planning information given to patient to review.  Patient declined advance care planning discussion at this time.    Social History     Tobacco Use     Smoking status: Never     Smokeless tobacco: Never   Substance Use Topics     Alcohol use: Yes         No flowsheet data found.    Reviewed orders with patient.  Reviewed health maintenance and updated orders accordingly - Yes  Labs reviewed in EPIC  BP Readings from Last 3 Encounters:   10/27/22 139/87   09/29/22 (!) 148/98   09/15/22 (!) 148/92    Wt Readings from Last 3 Encounters:   10/27/22 68 kg (150 lb)   09/29/22 68.5 kg (151 lb)   09/15/22 70.8 kg (156 lb)                  Patient Active Problem List   Diagnosis     Hypertension goal BP (blood pressure) < 140/90     Dizziness     Prediabetes     Tension headache     IUD (intrauterine device) in place     Reactive depression (situational)     No past surgical history on file.    Social History     Tobacco Use     Smoking status: Never     Smokeless tobacco: Never   Substance Use Topics     Alcohol use: Yes     Family History   Problem Relation Age of Onset     No Known Problems Mother      No Known Problems Father      No Known Problems Brother      No Known Problems Sister            Breast Cancer Screening:    Breast CA Risk Assessment (FHS-7) 10/27/2022   Do you have a family history of breast, colon, or ovarian cancer? No / Unknown         Mammogram Screening: Recommended annual mammography  Pertinent mammograms  "are reviewed under the imaging tab.    History of abnormal Pap smear: NO - age 30-65 PAP every 5 years with negative HPV co-testing recommended     Reviewed and updated as needed this visit by clinical staff   Tobacco  Allergies               Reviewed and updated as needed this visit by Provider                     Review of Systems  CONSTITUTIONAL: NEGATIVE for fever, chills, change in weight  INTEGUMENTARU/SKIN: NEGATIVE for worrisome rashes, moles or lesions  EYES: NEGATIVE for vision changes or irritation  ENT: NEGATIVE for ear, mouth and throat problems  RESP: NEGATIVE for significant cough or SOB  BREAST: NEGATIVE for masses, tenderness or discharge  CV: NEGATIVE for chest pain, palpitations or peripheral edema  GI: NEGATIVE for nausea, abdominal pain, heartburn, or change in bowel habits  : NEGATIVE for unusual urinary or vaginal symptoms. Periods are regular.  MUSCULOSKELETAL: NEGATIVE for significant arthralgias or myalgia  NEURO: NEGATIVE for weakness, dizziness or paresthesias  PSYCHIATRIC: NEGATIVE for changes in mood or affect     OBJECTIVE:   /87 (BP Location: Left arm, Patient Position: Chair, Cuff Size: Adult Regular)   Pulse 84   Temp 97.6  F (36.4  C) (Tympanic)   Resp 16   Ht 1.565 m (5' 1.61\")   Wt 68 kg (150 lb)   SpO2 99%   BMI 27.78 kg/m    Physical Exam  GENERAL: healthy, alert and no distress  EYES: Eyes grossly normal to inspection, PERRL and conjunctivae and sclerae normal  HENT: ear canals and TM's normal, nose and mouth without ulcers or lesions  NECK: no adenopathy, no asymmetry, masses, or scars and thyroid normal to palpation  RESP: lungs clear to auscultation - no rales, rhonchi or wheezes  BREAST: normal without masses, tenderness or nipple discharge and no palpable axillary masses or adenopathy  CV: regular rate and rhythm, normal S1 S2, no S3 or S4, no murmur, click or rub, no peripheral edema and peripheral pulses strong  ABDOMEN: soft, nontender, no " hepatosplenomegaly, no masses and bowel sounds normal   (female): normal female external genitalia, normal urethral meatus, vaginal mucosa pink, moist, well rugated, and normal cervix/adnexa/uterus without masses, white thick discharge noted.  Blue IUD string visualized at cervical os.   MS: no gross musculoskeletal defects noted, no edema  SKIN: no suspicious lesions or rashes  NEURO: Normal strength and tone, mentation intact and speech normal  PSYCH: mentation appears normal, affect normal/bright    Diagnostic Test Results:  Labs reviewed in Epic  No results found for this or any previous visit (from the past 24 hour(s)).    ASSESSMENT/PLAN:   (Z00.00) Routine general medical examination at a health care facility  (primary encounter diagnosis)  Comment:      HEALTH CARE MAINTENANCE              Reviewed USPTF recommendations and anticipatory guidance.              See orders.   I discussed in detail with Parish Larsen the importance of cervical cancer screenings through pap smears, will repeat pap every 5 year(s).      Tetanus and flu shots advised, she declined.  Patient information given.           (I10) Hypertension goal BP (blood pressure) < 140/90  Comment: BP stable.  Will leave medication as is.  Due to recheck labs.   Plan: Basic metabolic panel  (Ca, Cl, CO2, Creat,         Gluc, K, Na, BUN)            (Z12.31) Visit for screening mammogram  Comment: I discussed in detail with Parish Larsen the importance of breast cancer screening through monthly self breast exams and annual breast exams in the clinic.    Plan: MA SCREENING DIGITAL BILAT - Future  (s+30)            (Z12.11) Screen for colon cancer  Comment: I discussed the importance of colorectal cancer screening, including the risks and benefits of the various procedures, including colonoscopy, cologuard and annual FOB immunoassay test.   Patient would like to consider options and will reach back out.         Plan:     (Z12.4) Cervical cancer  "screening  Comment: I discussed the new pap recommendations regarding screening.  Explained the rationale for increased intervals between paps.  Questions asked and answered.  She does agree to this regimen.     Plan: Pap Screen with HPV - recommended age 30 - 65         years            (Z97.5) IUD (intrauterine device) in place  Comment: IUD in place (string is blue, this is likely Kyleena).  This should be replaced soon (as it has been present for 5 years).   Plan:     (N76.0) Vaginitis and vulvovaginitis  Comment: will check wetprep  Plan: Wet prep - Clinic Collect            (F32.9) Reactive depression (situational)  Comment: Discussed treatment options for depression/anxiety (medication, therapy, lifestyle changes).  She would like to consider this and get back to me.  Discussed that some of her previous symptoms may have been from uncontrolled stress/worry.   Plan:     Blurry vision:  Recommend f/u with optometrist for eye exam.   Patient has been advised of split billing requirements and indicates understanding: Yes      COUNSELING:  Reviewed preventive health counseling, as reflected in patient instructions       Regular exercise       Healthy diet/nutrition    Estimated body mass index is 27.78 kg/m  as calculated from the following:    Height as of this encounter: 1.565 m (5' 1.61\").    Weight as of this encounter: 68 kg (150 lb).        She reports that she has never smoked. She has never used smokeless tobacco.      Counseling Resources:  ATP IV Guidelines  Pooled Cohorts Equation Calculator  Breast Cancer Risk Calculator  BRCA-Related Cancer Risk Assessment: FHS-7 Tool  FRAX Risk Assessment  ICSI Preventive Guidelines  Dietary Guidelines for Americans, 2010  USDA's MyPlate  ASA Prophylaxis  Lung CA Screening    50 minutes spent on the date of the encounter doing chart review, history and exam, documentation and further activities per the note   Sydney Nair PA-C  [facility] " [place]

## 2022-10-27 NOTE — TELEPHONE ENCOUNTER
Called [facility] ; tried both patient phone numbers but no service line set up to leave message.  Patient doesn't have my chart set up.    Routing to  Pool, please send letter to patient with Sydney Nair's result notes regarding yeast infection/tx and follow up appointment, provided in previous message today.  Suly Ramesh RN

## 2022-10-27 NOTE — PATIENT INSTRUCTIONS
Please call [phone] to schedule the mammogram    Screening for colon cancer is now advised starting at age 45.   Colon cancer is the number 2 leading cause of cancer deaths in the [place], and is very treatable if caught in the early stages.  Unfortunately, people often do not have symptoms during the early stages, which is why preventative screening is so important.      The best, most thorough test is a colonoscopy.  The main draw back of this test is the preparation (diarrhea for a couple days prior).   If this is normal and you do not have a family history of colon cancer,  you do not need to repeat for 10 years.   The other option is a cologuard test (where we check your stool for signs of colon cancer).  If this is normal, you should repeat in 3 years. If this test comes back positive, you'll want to follow it up with a diagnostic colonoscopy.     Please let me know if you have had some sort of screening in the past (when, where and the results).  Otherwise, please let me know if you would like me to place an order for a colonoscopy or cologuard test.      Preventive Health Recommendations  Female Ages 40 to 49    Yearly exam:     See your health care provider every year in order to  1. Review health changes.   2. Discuss preventive care.    3. Review your medicines if your doctor prescribed any.      Get a Pap test every three years (unless you have an abnormal result and your provider advises testing more often).      If you get Pap tests with HPV test, you only need to test every 5 years, unless you have an abnormal result. You do not need a Pap test if your uterus was removed (hysterectomy) and you have not had cancer.      You should be tested each year for STDs (sexually transmitted diseases), if you're at risk.     Ask your doctor if you should have a mammogram.      Have a colonoscopy (test for colon cancer) if someone in your family has had colon cancer or polyps before age 50.       Have a  cholesterol test every 5 years.       Have a diabetes test (fasting glucose) after age 45. If you are at risk for diabetes, you should have this test every 3 years.    Shots: Get a flu shot each year. Get a tetanus shot every 10 years.     Nutrition:     Eat at least 5 servings of fruits and vegetables each day.    Eat whole-grain bread, whole-wheat pasta and brown rice instead of white grains and rice.    Get adequate Calcium and Vitamin D.      Lifestyle    Exercise at least 150 minutes a week (an average of 30 minutes a day, 5 days a week). This will help you control your weight and prevent disease.    Limit alcohol to one drink per day.    No smoking.     Wear sunscreen to prevent skin cancer.    See your dentist every six months for an exam and cleaning.

## 2022-10-27 NOTE — LETTER
[facility]  [address]  [place] [address]  Phone: [phone]        10/27/2022    Parish Larsen  [address]  [place] [address]  [phone] (home)     :     1975      To Whom it May Concern:    This patient was seen at our clinic on 10/27/2022 for care.    Please contact me for questions or concerns.    Sincerely,    Sydney Nair PA-C

## 2022-10-28 LAB
ANION GAP SERPL CALCULATED.3IONS-SCNC: 6 MMOL/L (ref 3–14)
BUN SERPL-MCNC: 6 MG/DL (ref 7–30)
CALCIUM SERPL-MCNC: 8.7 MG/DL (ref 8.5–10.1)
CHLORIDE BLD-SCNC: 109 MMOL/L (ref 94–109)
CO2 SERPL-SCNC: 29 MMOL/L (ref 20–32)
CREAT SERPL-MCNC: 0.66 MG/DL (ref 0.52–1.04)
CREAT UR-MCNC: 40 MG/DL
GFR SERPL CREATININE-BSD FRML MDRD: >90 ML/MIN/1.73M2
GLUCOSE BLD-MCNC: 116 MG/DL (ref 70–99)
IRON SATN MFR SERPL: 19 % (ref 15–46)
IRON SERPL-MCNC: 54 UG/DL (ref 35–180)
MICROALBUMIN UR-MCNC: <5 MG/L
MICROALBUMIN/CREAT UR: NORMAL MG/G{CREAT}
POTASSIUM BLD-SCNC: 3 MMOL/L (ref 3.4–5.3)
SODIUM SERPL-SCNC: 144 MMOL/L (ref 133–144)
TIBC SERPL-MCNC: 285 UG/DL (ref 240–430)

## 2022-10-31 ENCOUNTER — TELEPHONE (OUTPATIENT)
Dept: FAMILY MEDICINE | Facility: CLINIC | Age: 47
End: 2022-10-31

## 2022-10-31 LAB
BKR LAB AP GYN ADEQUACY: NORMAL
BKR LAB AP GYN INTERPRETATION: NORMAL
BKR LAB AP HPV REFLEX: NORMAL
BKR LAB AP PREVIOUS ABNORMAL: NORMAL
PATH REPORT.COMMENTS IMP SPEC: NORMAL
PATH REPORT.COMMENTS IMP SPEC: NORMAL
PATH REPORT.RELEVANT HX SPEC: NORMAL

## 2022-10-31 NOTE — TELEPHONE ENCOUNTER
----- Message from Sydney Nair PA-C sent at 10/31/2022  9:02 AM CDT -----  Please call Song (will need an )    Your potassium is low, I recommend you increase your intake of potassium rich foods, such as bananas, spinach, broccoli, asparagus, brussel sprouts, and tomatoes.  Let's recheck this level in 1 month (lab only).    Sugar is a little high in prediabetes range if she was fasting that day (unlikely, was drawn at 2:21 pm).  If she was not fasting, it is normal.     The rest of her labs are normal.     Please follow-up if you have any questions or concerns.    Sincerely,    Sydney Nair PA-C

## 2022-10-31 NOTE — TELEPHONE ENCOUNTER
No consent to communicate on file.    I called   Services, placed call to patient with assistance of [facility] [place]    Left message at home number for call back to [facility] nurse line at [phone].     also tried the mobile number, adult male answered.   Parish is at work, he verified the [phone] number is the best number to reach her at, she works until about 4:30 pm.    Will call again tomorrow if she does not respond.    Myriam Wall RN  [facility]

## 2022-10-31 NOTE — TELEPHONE ENCOUNTER
Son, Cameron, calling.   He is with his mom, requests we call her back with an  now.  Verified phone number is correct.    I called   Services, placed call to patient with assistance of [facility]  #[id number].  Tried the home number twice, went immediately to voicemail.    Tried the mobile number which I believe is the son.    He answered and put Song on the line.       Potassium   Date Value Ref Range Status   10/27/2022 3.0 (L) 3.4 - 5.3 mmol/L Final        Scheduled lab only for 12/2 to check repeat potassium.    She was not fasting for her recent lab draw, she ate lunch at 11 am.    She thinks Sydney wanted her to do another cholesterol test, should she fast for the upcoming lab test?    I see lipid panel was done in September.  Note on result says was fasting.    Routed to PCP, was another lipid panel advised?    Myriam Wall RN  [facility]

## 2022-10-31 NOTE — LETTER
[facility] [place]  [address] [place] [address]  [phone]        November 11, 2022    Parish Larsen  [address]  [place] [address]        Dear Parish Larsen    No we do not need another lipid panel as you had a fasting one done already in Sept. Upcoming lab is non-fasting.         Sincerely,      Sydney Nair PA-C

## 2022-11-01 LAB
HUMAN PAPILLOMA VIRUS 16 DNA: NEGATIVE
HUMAN PAPILLOMA VIRUS 18 DNA: NEGATIVE
HUMAN PAPILLOMA VIRUS FINAL DIAGNOSIS: NORMAL
HUMAN PAPILLOMA VIRUS OTHER HR: NEGATIVE

## 2022-11-01 NOTE — TELEPHONE ENCOUNTER
No we do not need another lipid panel as she had a fasting one done already in Sept.  Upcoming lab is non-fasting.      Sydney Nair PA-C

## 2022-11-01 NOTE — TELEPHONE ENCOUNTER
Per my conversations with patient yesterday, she works M-Th until 4:30 pm, is off on Fridays.  Will postpone call to 4:30 pm for best chance of patient being available for the call.    Myriam Wall RN  [facility]

## 2022-11-01 NOTE — TELEPHONE ENCOUNTER
Called [phone] (home) using [employer]   Did they answer the phone: No, left a message on voicemail to return call to the [facility] at [phone].    Called [phone] (Mobile).  Unable to leave a message, as the voicemail is not set up.  Luisa RN,BSN  Triage Nurse  [facility]: [facility]

## 2022-11-04 NOTE — TELEPHONE ENCOUNTER
I called   Services, placed call to patient with assistance of [facility] [place]    Placed call to mobile number [phone].   No answer, no voicemail set up.  Placed call to home number [phone].   No answer,  left message for call back to [place] nurse line at [phone] for message.    Myriam Wall RN  [facility]

## 2022-11-09 NOTE — TELEPHONE ENCOUNTER
Called with .    Called [phone] (home) using [employer]   Did they answer the phone: No, left a message on voicemail to return call to the [facility] at [phone].    Called [phone] (Mobile).  Unable to leave a message, as the voicemail is not set up.  Luisa RN,BSN  Triage Nurse  [facility]: [facility]

## 2022-11-11 NOTE — TELEPHONE ENCOUNTER
Patient has not called back.  Please mail message below per provider.  Luisa RN,BSN  Triage Nurse  [facility]: [facility]  Ph: [phone]

## 2022-11-28 DIAGNOSIS — I10 HYPERTENSION GOAL BP (BLOOD PRESSURE) < 140/90: ICD-10-CM

## 2022-11-28 RX ORDER — LISINOPRIL 10 MG/1
10 TABLET ORAL DAILY
Qty: 90 TABLET | Refills: 1 | OUTPATIENT
Start: 2022-11-28

## 2022-12-02 ENCOUNTER — LAB (OUTPATIENT)
Dept: LAB | Facility: CLINIC | Age: 47
End: 2022-12-02
Payer: COMMERCIAL

## 2022-12-02 DIAGNOSIS — E87.6 HYPOKALEMIA: ICD-10-CM

## 2022-12-02 DIAGNOSIS — Z11.59 NEED FOR HEPATITIS C SCREENING TEST: ICD-10-CM

## 2022-12-02 DIAGNOSIS — Z11.4 SCREENING FOR HIV (HUMAN IMMUNODEFICIENCY VIRUS): ICD-10-CM

## 2022-12-02 LAB — POTASSIUM BLD-SCNC: 4 MMOL/L (ref 3.4–5.3)

## 2022-12-02 PROCEDURE — 86803 HEPATITIS C AB TEST: CPT

## 2022-12-02 PROCEDURE — 84132 ASSAY OF SERUM POTASSIUM: CPT

## 2022-12-02 PROCEDURE — 87389 HIV-1 AG W/HIV-1&-2 AB AG IA: CPT

## 2022-12-02 PROCEDURE — 36415 COLL VENOUS BLD VENIPUNCTURE: CPT

## 2022-12-03 LAB
HCV AB SERPL QL IA: NONREACTIVE
HIV 1+2 AB+HIV1 P24 AG SERPL QL IA: NONREACTIVE

## 2022-12-05 ENCOUNTER — TELEPHONE (OUTPATIENT)
Dept: FAMILY MEDICINE | Facility: CLINIC | Age: 47
End: 2022-12-05

## 2022-12-05 NOTE — TELEPHONE ENCOUNTER
----- Message from Sydney Nair PA-C sent at 12/5/2022 11:33 AM CST -----  Please call Parish (will need ), her labs are all normal. Potassium back to a normal range . Continue to eat a healthy diet with a good balance of potassium rich foods.     Sydney Nair PA-C

## 2022-12-05 NOTE — TELEPHONE ENCOUNTER
RN left message to return call to clinic [phone]    Maira Granado RN on 12/5/2022 at 3:13 PM

## 2022-12-13 NOTE — TELEPHONE ENCOUNTER
Patient's son, Mg, called regarding message below.  Parish gave verbal consent to share HIPAA with son.      Son verbalized understanding of below message.     Obi Munroe RN

## 2022-12-14 DIAGNOSIS — I10 HYPERTENSION GOAL BP (BLOOD PRESSURE) < 140/90: ICD-10-CM

## 2022-12-15 RX ORDER — LISINOPRIL 10 MG/1
10 TABLET ORAL DAILY
Qty: 90 TABLET | Refills: 1 | OUTPATIENT
Start: 2022-12-15

## 2023-01-03 ENCOUNTER — TELEPHONE (OUTPATIENT)
Dept: FAMILY MEDICINE | Facility: CLINIC | Age: 48
End: 2023-01-03

## 2023-01-03 NOTE — TELEPHONE ENCOUNTER
Spoke to patients son Mg with pt consent . Pts son is requesting a refill of the lisinopril. Pt sons states that he contacted the Pharmacy ([facility] in [place]), and they stated there is no refill on file.    RN calling [facility] to verify ,[facility] confirms [facility] have the script and are going to fill it, will update the son as requested.     Attempted to call son to give him update, he did not answer message left.       Uma Delcid RN  [facility]

## 2023-01-04 NOTE — TELEPHONE ENCOUNTER
Notified Mg.  Luisa RN,BSN  Triage Nurse  [facility]: [facility]  Ph: [phone]

## 2023-01-04 NOTE — TELEPHONE ENCOUNTER
"Called    Mg Larsen (Son) (Child) [phone]       Did they answer the phone: No, unable to leave a message, as the \"person you are calling has a voicemail box, that has not been set up yet\"    Luisa RN,BSN  Triage Nurse  [facility]: [facility]        "

## 2023-07-10 DIAGNOSIS — I10 HYPERTENSION GOAL BP (BLOOD PRESSURE) < 140/90: ICD-10-CM

## 2023-07-10 RX ORDER — LISINOPRIL 10 MG/1
TABLET ORAL
Qty: 90 TABLET | Refills: 1 | Status: SHIPPED | OUTPATIENT
Start: 2023-07-10 | End: 2023-10-16

## 2023-10-16 ENCOUNTER — OFFICE VISIT (OUTPATIENT)
Dept: FAMILY MEDICINE | Facility: CLINIC | Age: 48
End: 2023-10-16
Payer: MEDICAID

## 2023-10-16 VITALS
SYSTOLIC BLOOD PRESSURE: 164 MMHG | TEMPERATURE: 98.5 F | RESPIRATION RATE: 14 BRPM | DIASTOLIC BLOOD PRESSURE: 84 MMHG | HEIGHT: 62 IN | HEART RATE: 85 BPM | OXYGEN SATURATION: 97 % | WEIGHT: 151 LBS | BODY MASS INDEX: 27.79 KG/M2

## 2023-10-16 DIAGNOSIS — Z30.433 ENCOUNTER FOR IUD REMOVAL AND REINSERTION: Primary | ICD-10-CM

## 2023-10-16 DIAGNOSIS — J39.2 THROAT IRRITATION: ICD-10-CM

## 2023-10-16 DIAGNOSIS — Z13.6 CARDIOVASCULAR SCREENING; LDL GOAL LESS THAN 160: ICD-10-CM

## 2023-10-16 DIAGNOSIS — I10 HYPERTENSION GOAL BP (BLOOD PRESSURE) < 140/90: ICD-10-CM

## 2023-10-16 PROCEDURE — 58300 INSERT INTRAUTERINE DEVICE: CPT | Performed by: PHYSICIAN ASSISTANT

## 2023-10-16 PROCEDURE — 99213 OFFICE O/P EST LOW 20 MIN: CPT | Mod: 25 | Performed by: PHYSICIAN ASSISTANT

## 2023-10-16 PROCEDURE — 58301 REMOVE INTRAUTERINE DEVICE: CPT | Performed by: PHYSICIAN ASSISTANT

## 2023-10-16 RX ORDER — LISINOPRIL 10 MG/1
10 TABLET ORAL DAILY
Qty: 90 TABLET | Refills: 0 | Status: SHIPPED | OUTPATIENT
Start: 2023-10-16 | End: 2023-12-18 | Stop reason: SINTOL

## 2023-10-16 ASSESSMENT — PATIENT HEALTH QUESTIONNAIRE - PHQ9
SUM OF ALL RESPONSES TO PHQ QUESTIONS 1-9: 0
SUM OF ALL RESPONSES TO PHQ QUESTIONS 1-9: 0
10. IF YOU CHECKED OFF ANY PROBLEMS, HOW DIFFICULT HAVE THESE PROBLEMS MADE IT FOR YOU TO DO YOUR WORK, TAKE CARE OF THINGS AT HOME, OR GET ALONG WITH OTHER PEOPLE: NOT DIFFICULT AT ALL

## 2023-10-16 ASSESSMENT — PAIN SCALES - GENERAL: PAINLEVEL: NO PAIN (0)

## 2023-10-16 NOTE — PROGRESS NOTES
Assessment & Plan     Encounter for IUD removal and reinsertion  Parish Larsen is a 48 year old female who presents with daughter and  (on phone) today requesting replacement of an intrauterine device.  She currently has the Kyleena IUD and is tolerating it well with no complications.    The patient meets and is agreeable to the following conditions:   She is parous.   She is not interested in conception in the near future.   She currently is in a stable, monogamous relationship.   There is no previous history of pelvic inflammatory disease.   There is no previous history of ectopic pregnancy.   She is willing to check monthly for the IUD string.   She is at least 8 weeks post-partum.   There is no history of unresolved abnormal uterine bleeding.   There is no history of an unresolved abnormal PAP smear.   She has no history of Billy's disease or an allergy to copper (for ParaGard).   She has no history of diabetes, AIDS, leukemia, IV drug use or chronic steroid use.   She is willing to return annually for pelvic exams.   She is current on pap smear testing.   She denies the possibility of pregnancy.   The following risks were discussed with the patient:   Possibility of pregnancy and ectopic pregnancy.   Possibility of pelvic inflammatory disease, particularly with new partners.   Risk of uterine perforation or IUD expulsion.   Possibility of difficult removal.   Spotting or heavy bleeding.   Cramping, pain or infection during or after insertion.   The patient was given patient information on the IUD and the patient education brochure from the . The patient has given consent to proceed with placement of the IUD. A written consent form is present in the chart. She wishes to proceed.   PROCEDURE:   Type of IUD: Mirena  She is placed in a dorsal lithotomy potion and a pelvic exam is performed to determine the position of the uterus. The cervix is identified and IUD string visualized at cervical  os and grasped with a ring forceps.  IUD removed easily without complication.  Cervix then cleaned with betadine three times. A single tooth tenaculum is applied to the anterior lip of the cervix for stabilization. The uterus is sounded to 9 cm and removed. (Target sound depth is 6.5 cm to 8.5 cm.) The IUD insertion tube is prepared to manufacturers recommendations and inserted into the uterus under sterile conditions to the sounding depth. The arms of the IUD are then opened by withdrawing the insertion tube 2.0 cm while stabilizing the solid insertion sandy without difficulty. The IUD string is then cut to 5.0 cm.   The patient tolerated this procedure without immediate complication. The patient is to return or call immediately for any unexplained fever, abdominal or pelvic pain, excessive bleeding, possibility of pregnancy, foul-smelling discharge, sense that the IUD has been expelled.  Follow-up in 1 month for a recheck.  Sydney Nair PA-C          [id number] AK INSERT INTRAUTERINE DEVICE   [id number] AK REMOVE INTRAUTERINE DEVICE    Mirena    Paraguard    - levonorgestrel (MIRENA) 52 MG (20 mcg/day) IUD 1 each  - INSERTION INTRAUTERINE DEVICE  - REMOVE INTRAUTERINE DEVICE  - levonorgestrel (MIRENA) 52 MG (20 mcg/day) IUD; 1 each by Intrauterine route continuous    Hypertension goal BP (blood pressure) < 140/90  BP likely a little elevated today due to upcoming procedure.  Home readings have been looking good.  Will leave medication as is and re-check in 1 month when she comes in for her IUD recheck.  Will do non-fasting labs at that time as well.    May need to switch to losartan if she still notices a tickle in throat.   - Basic metabolic panel  (Ca, Cl, CO2, Creat, Gluc, K, Na, BUN); Future  - Albumin Random Urine Quantitative with Creat Ratio; Future  - lisinopril (ZESTRIL) 10 MG tablet; Take 1 tablet (10 mg) by mouth daily    CARDIOVASCULAR SCREENING; LDL GOAL LESS THAN 160  Will check with next set of  "labs.   - LDL cholesterol direct; Future    Throat irritation  May be viral vs due to ACE-I.  Symptoms only present for 4 days, will recheck at next visit.  I suggest throat lozenges for now.              BMI:   Estimated body mass index is 28.07 kg/m  as calculated from the following:    Height as of this encounter: 1.562 m (5' 1.5\").    Weight as of this encounter: 68.5 kg (151 lb).       FUTURE APPOINTMENTS:       - Follow-up visit in 1 month    Sydney Nair PA-C  [facility] [place] is a 48 year old, presenting for the following health issues:  IUD      HPI       *Presenting for IUD removal/replacement. Placed at outside location >5 years ago. UTD on pap smear.    *Requesting rx for itchy throat x 4 days.  Patient denies any fever, chills or sweats.  No sore throat.  She feels a slight tickle in her throat and a mild cough.     *Need to be taking lisinopril 10mg? She was out of refills when she last checked at pharmacy. Is down to last couple tablets. Last took today at 11:00.  Typically home BP's 120/80's mmHg.               Review of Systems   Constitutional, HEENT, cardiovascular, pulmonary, GI, , musculoskeletal, neuro, skin, endocrine and psych systems are negative, except as otherwise noted.      Objective    BP (!) 164/84   Pulse 85   Temp 98.5  F (36.9  C) (Tympanic)   Resp 14   Ht 1.562 m (5' 1.5\")   Wt 68.5 kg (151 lb)   SpO2 97%   BMI 28.07 kg/m    Body mass index is 28.07 kg/m .  Physical Exam   GENERAL: healthy, alert and no distress   (female): normal female external genitalia, normal urethral meatus, vaginal mucosa, normal cervix/adnexa/uterus without masses or discharge, IUD string visualized.   ENT: throat, clear, no erythema or exudates.                        "

## 2023-10-16 NOTE — PATIENT INSTRUCTIONS
Everything went great with the insertion today.  Your cramping and pain should continue to fade from here.  Ibuprofen is generally best for the cramping pain as well as heating pads.     If you develop worsened or more severe pelvic pain, heavy vaginal bleeding or fever please seek emergent care.     I would recommend you check for your strings over the next few days to confirm you can feel them in the vagina.  Please let us know if you cannot identify them.     We should visit again in 1 month for a recheck and to make sure things are going well

## 2023-12-18 ENCOUNTER — OFFICE VISIT (OUTPATIENT)
Dept: FAMILY MEDICINE | Facility: CLINIC | Age: 48
End: 2023-12-18
Payer: COMMERCIAL

## 2023-12-18 VITALS
HEIGHT: 62 IN | DIASTOLIC BLOOD PRESSURE: 98 MMHG | WEIGHT: 156 LBS | RESPIRATION RATE: 16 BRPM | TEMPERATURE: 97.8 F | OXYGEN SATURATION: 100 % | SYSTOLIC BLOOD PRESSURE: 144 MMHG | HEART RATE: 87 BPM | BODY MASS INDEX: 28.71 KG/M2

## 2023-12-18 DIAGNOSIS — Z13.6 CARDIOVASCULAR SCREENING; LDL GOAL LESS THAN 160: ICD-10-CM

## 2023-12-18 DIAGNOSIS — Z97.5 IUD (INTRAUTERINE DEVICE) IN PLACE: Primary | ICD-10-CM

## 2023-12-18 DIAGNOSIS — I10 HYPERTENSION GOAL BP (BLOOD PRESSURE) < 140/90: ICD-10-CM

## 2023-12-18 DIAGNOSIS — Z23 ENCOUNTER FOR IMMUNIZATION: ICD-10-CM

## 2023-12-18 PROCEDURE — 99214 OFFICE O/P EST MOD 30 MIN: CPT | Mod: 25 | Performed by: PHYSICIAN ASSISTANT

## 2023-12-18 PROCEDURE — 80048 BASIC METABOLIC PNL TOTAL CA: CPT | Performed by: PHYSICIAN ASSISTANT

## 2023-12-18 PROCEDURE — 82043 UR ALBUMIN QUANTITATIVE: CPT | Performed by: PHYSICIAN ASSISTANT

## 2023-12-18 PROCEDURE — 82570 ASSAY OF URINE CREATININE: CPT | Performed by: PHYSICIAN ASSISTANT

## 2023-12-18 PROCEDURE — 90471 IMMUNIZATION ADMIN: CPT | Performed by: PHYSICIAN ASSISTANT

## 2023-12-18 PROCEDURE — 36415 COLL VENOUS BLD VENIPUNCTURE: CPT | Performed by: PHYSICIAN ASSISTANT

## 2023-12-18 PROCEDURE — 90686 IIV4 VACC NO PRSV 0.5 ML IM: CPT | Performed by: PHYSICIAN ASSISTANT

## 2023-12-18 PROCEDURE — 83721 ASSAY OF BLOOD LIPOPROTEIN: CPT | Performed by: PHYSICIAN ASSISTANT

## 2023-12-18 RX ORDER — LOSARTAN POTASSIUM 50 MG/1
50 TABLET ORAL DAILY
Qty: 30 TABLET | Refills: 1 | Status: SHIPPED | OUTPATIENT
Start: 2023-12-18 | End: 2024-05-29

## 2023-12-18 NOTE — PATIENT INSTRUCTIONS
I suggest stopping the lisinopril blood pressure medication (as this likely is causing the cough).    Instead we'll start a medication called losartan to help control your blood pressure .      Flu shot given today, I suggest you do a covid booster (can do this at physical next month if you prefer)

## 2023-12-18 NOTE — PROGRESS NOTES
"  Assessment & Plan     IUD (intrauterine device) in place  IUD in place, doing well.     Hypertension goal BP (blood pressure) < 140/90  BP not at goal and having side effect of cough with lisinopril.  I suggest switching to losartan and will bump up the dose to 50 mg, as she is not well controlled with lisinopril 10 mg.    Recheck BP in 1 month at physical.   - Albumin Random Urine Quantitative with Creat Ratio  - Basic metabolic panel  (Ca, Cl, CO2, Creat, Gluc, K, Na, BUN)  - losartan (COZAAR) 50 MG tablet; Take 1 tablet (50 mg) by mouth daily    CARDIOVASCULAR SCREENING; LDL GOAL LESS THAN 160  Due for screening, not fasting today.   - LDL cholesterol direct    Encounter for immunization  Flu shot given, she would like to wait on the covid booster for now (may do at physical)       30 minutes spent by me on the date of the encounter doing chart review, history and exam, documentation and further activities per the note       BMI:   Estimated body mass index is 29 kg/m  as calculated from the following:    Height as of this encounter: 1.562 m (5' 1.5\").    Weight as of this encounter: 70.8 kg (156 lb).   Weight management plan: Discussed healthy diet and exercise guidelines        [facility] [place] is a 48 year old, presenting for the following health issues:  Hypertension and IUD (Check)        12/18/2023     2:53 PM   Additional Questions   Roomed by Kori   Accompanied by Son         12/18/2023     2:53 PM   Patient Reported Additional Medications   Patient reports taking the following new medications na       History of Present Illness       Reason for visit:  Iud    She eats 2-3 servings of fruits and vegetables daily.She consumes 1 sweetened beverage(s) daily.She exercises with enough effort to increase her heart rate 9 or less minutes per day.  She exercises with enough effort to increase her heart rate 3 or less days per week.   She is taking " "medications regularly.       Patient arrived for Mirena IUD Check, placed 10/16/23.  Has mild spotting.       Patient has been having mild spotting, no cramping.     Hypertension Follow-up    Do you check your blood pressure regularly outside of the clinic? Yes   Are you following a low salt diet? No  Are your blood pressures ever more than 140 on the top number (systolic) OR more   than 90 on the bottom number (diastolic), for example 140/90? Yes - Usually averaging around 135 - 140's (unsure what the bottom number is)    Has had a cough since starting the lisinopril.  Has tried to treat with OTC cough medication but it never got better.     Review of Systems   Constitutional, HEENT, cardiovascular, pulmonary, GI, , musculoskeletal, neuro, skin, endocrine and psych systems are negative, except as otherwise noted.      Objective    BP (!) 144/98 (BP Location: Left arm, Patient Position: Chair, Cuff Size: Adult Regular)   Pulse 87   Temp 97.8  F (36.6  C) (Tympanic)   Resp 16   Ht 1.562 m (5' 1.5\")   Wt 70.8 kg (156 lb)   SpO2 100%   BMI 29.00 kg/m    Body mass index is 29 kg/m .  Physical Exam   GENERAL: healthy, alert and no distress  NECK: no adenopathy, no asymmetry, masses, or scars and thyroid normal to palpation  RESP: lungs clear to auscultation - no rales, rhonchi or wheezes  CV: regular rate and rhythm, normal S1 S2, no S3 or S4, no murmur, click or rub, no peripheral edema and peripheral pulses strong   (female): normal female external genitalia, normal urethral meatus, vaginal mucosa, normal cervix/adnexa/uterus without masses or discharge, IUD noted at cervical os  MS: no gross musculoskeletal defects noted, no edema                    "

## 2023-12-18 NOTE — LETTER
My Depression Action Plan  Name: Parish Larsen   Date of Birth 1975  Date: 12/18/2023    My doctor: Sydney Nair   [facility]  [address]  [place] [address] [phone]            GREEN    ZONE   Good Control    What it looks like:   Things are going generally well. You have normal ups and downs. You may even feel depressed from time to time, but bad moods usually last less than a day.   What you need to do:  Continue to care for yourself (see self care plan)  Check your depression survival kit and update it as needed  Follow your physician s recommendations including any medication.  Do not stop taking medication unless you consult with your physician first.             YELLOW         ZONE Getting Worse    What it looks like:   Depression is starting to interfere with your life.   It may be hard to get out of bed; you may be starting to isolate yourself from others.  Symptoms of depression are starting to last most all day and this has happened for several days.   You may have suicidal thoughts but they are not constant.   What you need to do:     Call your care team. Your response to treatment will improve if you keep your care team informed of your progress. Yellow periods are signs an adjustment may need to be made.     Continue your self-care.  Just get dressed and ready for the day.  Don't give yourself time to talk yourself out of it.    Talk to someone in your support network.    Open up your Depression Self-Care Plan/Wellness Kit.             RED    ZONE Medical Alert - Get Help    What it looks like:   Depression is seriously interfering with your life.   You may experience these or other symptoms: You can t get out of bed most days, can t work or engage in other necessary activities, you have trouble taking care of basic hygiene, or basic responsibilities, thoughts of suicide or death that will not go away, self-injurious  behavior.     What you need to do:  Call your care team and request a same-day appointment. If they are not available (weekends or after hours) call your local crisis line, emergency room or 911.          Depression Self-Care Plan / Wellness Kit    Many people find that medication and therapy are helpful treatments for managing depression. In addition, making small changes to your everyday life can help to boost your mood and improve your wellbeing. Below are some tips for you to consider. Be sure to talk with your medical provider and/or behavioral health consultant if your symptoms are worsening or not improving.     Sleep   Sleep hygiene  means all of the habits that support good, restful sleep. It includes maintaining a consistent bedtime and wake time, using your bedroom only for sleeping or sex, and keeping the bedroom dark and free of distractions like a computer, smartphone, or television.     Develop a Healthy Routine  Maintain good hygiene. Get out of bed in the morning, make your bed, brush your teeth, take a shower, and get dressed. Don t spend too much time viewing media that makes you feel stressed. Find time to relax each day.    Exercise  Get some form of exercise every day. This will help reduce pain and release endorphins, the  feel good  chemicals in your brain. It can be as simple as just going for a walk or doing some gardening, anything that will get you moving.      Diet  Strive to eat healthy foods, including fruits and vegetables. Drink plenty of water. Avoid excessive sugar, caffeine, alcohol, and other mood-altering substances.     Stay Connected with Others  Stay in touch with friends and family members.    Manage Your Mood  Try deep breathing, massage therapy, biofeedback, or meditation. Take part in fun activities when you can. Try to find something to smile about each day.     Psychotherapy  Be open to working with a therapist if your provider recommends it.     Medication  Be sure to  take your medication as prescribed. Most anti-depressants need to be taken every day. It usually takes several weeks for medications to work. Not all medicines work for all people. It is important to follow-up with your provider to make sure you have a treatment plan that is working for you. Do not stop your medication abruptly without first discussing it with your provider.    Crisis Resources   These hotlines are for both adults and children. They and are open 24 hours a day, 7 days a week unless noted otherwise.    National Suicide Prevention Lifeline   988 or 4-531-249-KHPY (4966)    Crisis Text Line    www.crisistextline.org  Text HOME to 025131 from anywhere in the [place], anytime, about any type of crisis. A live, trained crisis counselor will receive the text and respond quickly.    [employer] Lifeline for LGBTQ Youth  A national crisis intervention and suicide lifeline for LGBTQ youth under 25. Provides a safe place to talk without judgement. Call 1-431.373.3462; text START to 501261 or visit www.thetrevorproject.org to talk to a trained counselor.    For [facility] crisis numbers, visit the [facility] website at:  https://[place].gov/dhs/people-we-serve/adults/health-care/mental-health/resources/crisis-contacts.jsp

## 2023-12-19 LAB
ANION GAP SERPL CALCULATED.3IONS-SCNC: 9 MMOL/L (ref 7–15)
BUN SERPL-MCNC: 12.8 MG/DL (ref 6–20)
CALCIUM SERPL-MCNC: 9.4 MG/DL (ref 8.6–10)
CHLORIDE SERPL-SCNC: 103 MMOL/L (ref 98–107)
CREAT SERPL-MCNC: 0.59 MG/DL (ref 0.51–0.95)
CREAT UR-MCNC: 18.4 MG/DL
DEPRECATED HCO3 PLAS-SCNC: 26 MMOL/L (ref 22–29)
EGFRCR SERPLBLD CKD-EPI 2021: >90 ML/MIN/1.73M2
GLUCOSE SERPL-MCNC: 99 MG/DL (ref 70–99)
LDLC SERPL DIRECT ASSAY-MCNC: 169 MG/DL
MICROALBUMIN UR-MCNC: <12 MG/L
MICROALBUMIN/CREAT UR: NORMAL MG/G{CREAT}
POTASSIUM SERPL-SCNC: 4.1 MMOL/L (ref 3.4–5.3)
SODIUM SERPL-SCNC: 138 MMOL/L (ref 135–145)

## 2023-12-19 RX ORDER — LOSARTAN POTASSIUM 50 MG/1
50 TABLET ORAL DAILY
Qty: 90 TABLET | OUTPATIENT
Start: 2023-12-19

## 2023-12-22 ENCOUNTER — TELEPHONE (OUTPATIENT)
Dept: FAMILY MEDICINE | Facility: CLINIC | Age: 48
End: 2023-12-22
Payer: COMMERCIAL

## 2023-12-22 NOTE — TELEPHONE ENCOUNTER
RN left message with  to return call to clinic [phone]    Maira Granado RN on 12/22/2023 at 9:08 AM

## 2023-12-22 NOTE — LETTER
December 29, 2023      Parish Larsen  [address] [facility] [address]        Dear ,    We are writing to inform you of your test results. We have tried to reach you by telephone and have been unsuccessful.     Your cholesterol levels are high. Genetics, diet, weight and low exercise levels can contribute to this. Elevated LDL cholesterol and triglycerides as well as low HDL cholesterol all increase a person's risk for heart and vascular disease.      Research has shown the Mediterranean diet and regular exercise is helpful in reducing heart risk and helping people lose weight.      The rest of her labs look good.      Resulted Orders   LDL cholesterol direct   Result Value Ref Range    LDL Cholesterol Direct 169 (H) <100 mg/dL      Comment:      Age 2-19 years:  Desirable: 0-110 mg/dL   Borderline high: 110-129 mg/dL   High: >= 130 mg/dL    Age 20 years and older:  Desirable: <100mg/dL  Above desirable: 100-129 mg/dL   Borderline high: 130-159 mg/dL   High: 160-189 mg/dL   Very high: >= 190 mg/dL   Albumin Random Urine Quantitative with Creat Ratio   Result Value Ref Range    Creatinine Urine mg/dL 18.4 mg/dL      Comment:      The reference ranges have not been established in urine creatinine. The results should be integrated into the clinical context for interpretation.    Albumin Urine mg/L <12.0 mg/L      Comment:      The reference ranges have not been established in urine albumin. The results should be integrated into the clinical context for interpretation.    Albumin Urine mg/g Cr        Comment:      Unable to calculate, urine albumin and/or urine creatinine is outside detectable limits.  Microalbuminuria is defined as an albumin:creatinine ratio of 17 to 299 for males and 25 to 299 for females. A ratio of albumin:creatinine of 300 or higher is indicative of overt proteinuria.  Due to biologic variability, positive results should be confirmed by a second, first-morning random or 24-hour timed urine  specimen. If there is discrepancy, a third specimen is recommended. When 2 out of 3 results are in the microalbuminuria range, this is evidence for incipient nephropathy and warrants increased efforts at glucose control, blood pressure control, and institution of therapy with an angiotensin-converting-enzyme (ACE) inhibitor (if the patient can tolerate it).     Basic metabolic panel  (Ca, Cl, CO2, Creat, Gluc, K, Na, BUN)   Result Value Ref Range    Sodium 138 135 - 145 mmol/L      Comment:      Reference intervals for this test were updated on 09/26/2023 to more accurately reflect our healthy population. There may be differences in the flagging of prior results with similar values performed with this method. Interpretation of those prior results can be made in the context of the updated reference intervals.     Potassium 4.1 3.4 - 5.3 mmol/L    Chloride 103 98 - 107 mmol/L    Carbon Dioxide (CO2) 26 22 - 29 mmol/L    Anion Gap 9 7 - 15 mmol/L    Urea Nitrogen 12.8 6.0 - 20.0 mg/dL    Creatinine 0.59 0.51 - 0.95 mg/dL    GFR Estimate >90 >60 mL/min/1.73m2    Calcium 9.4 8.6 - 10.0 mg/dL    Glucose 99 70 - 99 mg/dL       If you have any questions or concerns, please call the clinic at the number listed above.       Sincerely,    Sydney Nair PA-C

## 2023-12-22 NOTE — TELEPHONE ENCOUNTER
----- Message from Sydney Nair PA-C sent at 12/21/2023  2:30 PM CST -----  Please call Song with an ,    Your cholesterol levels are high.  Genetics, diet, weight and low exercise levels can contribute to this.  Elevated LDL cholesterol and triglycerides as well as low HDL cholesterol all increase a person's risk for heart and vascular disease.     Research has shown the Mediterranean diet and regular exercise is helpful in reducing heart risk and helping people lose weight.       The rest of her labs look good.     Please follow-up if you have any questions or concerns.    Sincerely,    Sydney Nair PA-C

## 2023-12-26 NOTE — TELEPHONE ENCOUNTER
Called [phone] (home) using [employer] .   Did they answer the phone: No, left a message on voicemail to return call to the [facility] at [phone], and to ask for any available triage nurse.    Luisa RN BSN  Triage Nurse  [facility]

## 2023-12-28 NOTE — TELEPHONE ENCOUNTER
Called with . Did they answer the phone: No, left a message on voicemail to return call to the [facility] at [phone], and to ask for any available triage nurse.    Luisa ZAPATA BSN  Triage Nurse  [facility]

## 2023-12-29 NOTE — TELEPHONE ENCOUNTER
Patient has not called back.    Please mail the below information to patient.    Luisa BSN RN  Triage Nurse  [facility]

## 2024-01-02 NOTE — TELEPHONE ENCOUNTER
Pt and son returning call and requesting [facility] . RN called [facility]  to relay pcp's message below.     RN discussed alternatives to red meats such as fish. Substitute butter to olive oil. Snacks such as nuts. Also exercise 30 mins a day such as walking.     Pt states that she's not available until after 3pm for next appointment and will need a later time as she cannot make already scheduled appointment. RN scheduled pt for annual physical 2/28/24 per pt request.     Yanni Lee RN on 1/2/2024 at 4:47 PM

## 2024-03-29 ENCOUNTER — OFFICE VISIT (OUTPATIENT)
Dept: URGENT CARE | Facility: URGENT CARE | Age: 49
End: 2024-03-29

## 2024-03-29 VITALS
BODY MASS INDEX: 29.89 KG/M2 | DIASTOLIC BLOOD PRESSURE: 103 MMHG | SYSTOLIC BLOOD PRESSURE: 167 MMHG | OXYGEN SATURATION: 99 % | HEART RATE: 83 BPM | RESPIRATION RATE: 20 BRPM | TEMPERATURE: 98.1 F | WEIGHT: 160.8 LBS

## 2024-03-29 DIAGNOSIS — R05.9 COUGH IN ADULT: Primary | ICD-10-CM

## 2024-03-29 PROCEDURE — 99203 OFFICE O/P NEW LOW 30 MIN: CPT | Performed by: PHYSICIAN ASSISTANT

## 2024-03-29 RX ORDER — BENZONATATE 200 MG/1
200 CAPSULE ORAL 3 TIMES DAILY PRN
Qty: 21 CAPSULE | Refills: 0 | Status: SHIPPED | OUTPATIENT
Start: 2024-03-29 | End: 2024-04-05

## 2024-03-29 NOTE — PROGRESS NOTES
Assessment & Plan     1. Cough in adult    - benzonatate (TESSALON) 200 MG capsule; Take 1 capsule (200 mg) by mouth 3 times daily as needed for cough  Dispense: 21 capsule; Refill: 0       Follow up with PCP if not improving over the next week.                   ELLIE Garcia [facility]    Jennifer Lugo is a 48 year old female who presents to clinic today for the following health issues:  Chief Complaint   Patient presents with    Cough     Cough for the last week, worse at night. Feels like heart is beating stronger at night and some chest tightness a couple days ago but none today. Nasal congestion as well.      HPI    Here for one week of cough. It has improved. No fever. No history of asthma or smoking tobacco. No difficulty breathing. No sore throat. Nasal congestion. Quiet at night is hearing a noise. She does not know what it is.             Review of Systems        Objective    BP (!) 167/103 (BP Location: Right arm, Cuff Size: Adult Regular)   Pulse 83   Temp 98.1  F (36.7  C) (Tympanic)   Resp 20   Wt 72.9 kg (160 lb 12.8 oz)   SpO2 99%   BMI 29.89 kg/m    Physical Exam  Vitals and nursing note reviewed.   Constitutional:       General: She is not in acute distress.     Appearance: She is well-developed. She is not diaphoretic.   HENT:      Head: Normocephalic and atraumatic.      Right Ear: Tympanic membrane and external ear normal.      Left Ear: Tympanic membrane and external ear normal.      Mouth/Throat:      Mouth: Mucous membranes are moist.      Pharynx: No posterior oropharyngeal erythema.   Eyes:      Pupils: Pupils are equal, round, and reactive to light.   Neck:      Vascular: No carotid bruit.   Cardiovascular:      Rate and Rhythm: Normal rate and regular rhythm.   Pulmonary:      Effort: Pulmonary effort is normal. No respiratory distress.      Breath sounds: Normal breath sounds.   Musculoskeletal:      Cervical back: Normal range of motion  and neck supple.   Lymphadenopathy:      Cervical: No cervical adenopathy.   Neurological:      Mental Status: She is alert.

## 2024-05-22 ENCOUNTER — TELEPHONE (OUTPATIENT)
Dept: FAMILY MEDICINE | Facility: CLINIC | Age: 49
End: 2024-05-22

## 2024-05-22 DIAGNOSIS — Z59.71 DOES NOT HAVE HEALTH INSURANCE: Primary | ICD-10-CM

## 2024-05-22 DIAGNOSIS — I10 HYPERTENSION GOAL BP (BLOOD PRESSURE) < 140/90: ICD-10-CM

## 2024-05-22 NOTE — LETTER
June 7, 2024      Parish Larsen  [address] [facility] [address]        Dear Parish,     Regarding refill request for losartan    We received a refill request for your medication. They have been approved for a 3 month supply.      You are due for a inperson visit prior to further refills. You can schedule appointment via your [employer] account or call [phone] to schedule.      [facility]        Sincerely,        Sydney Nair PA-C/mp

## 2024-05-22 NOTE — LETTER
June 27, 2024      Parish Larsen  [address] [facility] [address]        Dear Parish,      We have tried multiple times to reach you but have been unsuccessful.  Please call our clinic at phone number [phone] as soon as possible and ask to speak with any available triage nurse.      Sincerely,      [facility]

## 2024-05-23 ENCOUNTER — APPOINTMENT (OUTPATIENT)
Dept: INTERPRETER SERVICES | Facility: CLINIC | Age: 49
End: 2024-05-23

## 2024-05-23 NOTE — TELEPHONE ENCOUNTER
Routing to triage team to contact patient and confirm reason for gap/break in medication >90 days. Please route reason for refill request and encounter to prescribing provider for review.

## 2024-05-23 NOTE — TELEPHONE ENCOUNTER
Called patient using [employer] . Did they answer the phone: No, left a message on voicemail to return call to the [facility] at [phone], and to ask for any available triage nurse.  (RN did not leave specific details on voicemail for confidential reasons)    Luisa RAMÍREZ RN  Triage Nurse  [facility]

## 2024-05-27 NOTE — TELEPHONE ENCOUNTER
FYI - Status Update    Who is Calling: family member, son      Update: Patient's son returning call to see about the losartan prescription - please call patient on phone number [phone]    Does caller want a call/response back: Yes     Okay to leave a detailed message?: Yes at Cell number on file:    Telephone Information:   Mobile [phone]

## 2024-05-28 NOTE — TELEPHONE ENCOUNTER
Patients son stated that she has not been taking medication regularly. since she has been out of med for a couple of months or so, or when ever she feels that she has high BPs.    He did not know all of this information.  He will have patients mom call us back.    Luisa RAMÍREZ RN  Triage Nurse  [facility]

## 2024-05-29 RX ORDER — LOSARTAN POTASSIUM 50 MG/1
50 TABLET ORAL DAILY
Qty: 90 TABLET | Refills: 0 | Status: SHIPPED | OUTPATIENT
Start: 2024-05-29

## 2024-05-29 NOTE — TELEPHONE ENCOUNTER
Please call patient, due for recheck for further refills, please assist patient in scheduling in person appt (physical/BP recheck).  90 day goran refill given so she has time to get in.    Sydney Nair PA-C

## 2024-05-29 NOTE — TELEPHONE ENCOUNTER
With the assistance of a [facility] , we called [phone].     Message left for patient to call the clinic at [phone] and ask to speak with a Triage Nurse.     Clari CHURCHN  [facility]

## 2024-05-29 NOTE — TELEPHONE ENCOUNTER
When patient calls back, did you want her to come in for a BP Check? Also, due for annual physical.     BP Readings from Last 3 Encounters:   03/29/24 (!) 167/103   12/18/23 (!) 144/98   10/16/23 (!) 164/84     Please review and advise.     Clari CHURCHN  [facility]

## 2024-06-04 NOTE — TELEPHONE ENCOUNTER
Attempted to call patient with [facility]  to relay provider's message below with no answer, left voicemail to call clinic back at [phone].    Yanni Lee, RN on 6/4/2024 at 3:41 PM

## 2024-06-06 NOTE — TELEPHONE ENCOUNTER
Please send a letter to patient that she is due in the office for a recheck on her medications.   Sydney Nair PA-C

## 2024-06-06 NOTE — TELEPHONE ENCOUNTER
Patient has not called back.    Please mail letter to patient.    Luisa RAMÍREZ RN  Triage Nurse  [facility]

## 2024-06-10 NOTE — TELEPHONE ENCOUNTER
Pt asks if there is any way that provider can approve further refills without visit. Please advise.    Patient is returning call (with assistance of [facility] ). Relayed message from Sydney Nair PA-C. Pt sates that right now she does not have health insurance. She doesn't want to come into the clinic and then have to pay out of pocket for the visit. She states that she was seen recently and had to pay out of pocket for the visit and she cannot afford to do that again. Notified her that 90 day supply of medication was sent on 5/29. Per provider, further refills beyond that cannot be given without a visit. Pt is wondering if she should call Medicaid to see if it can be re-instated or if she would need to re-apply. She states that they had medicaid, her and her son's. One of her son's recently went from part time to full time employment. They had to submit pay stub and after he did this, they were notified that the youngest still qualifies, but she does not. Asked her if she would like writer to place a care coordination referral to assist further with resources for insurance. Pt agreeable to referral. CC referral placed. Pt states that if she can get insurance again, then she for sure will come see provider. States that she has been doing well on her losartan with no issues or side effects.    Ghislaine Werner RN

## 2024-06-11 ENCOUNTER — PATIENT OUTREACH (OUTPATIENT)
Dept: CARE COORDINATION | Facility: CLINIC | Age: 49
End: 2024-06-11

## 2024-06-11 NOTE — TELEPHONE ENCOUNTER
Attempted to call patient with [facility]  to relay provider's message below with no answer, left voicemail to call clinic back at [phone].    Yanni Lee RN on 6/11/2024 at 10:02 AM

## 2024-06-11 NOTE — TELEPHONE ENCOUNTER
Will await for assistance from care coordinator or insurance.      I suggest she sign up for [employer] and start an evisit for a refill on her medication to last until she is able to get insurance (this will be less expensive than a phone or video visit).  During the evisit, I would like to know what her home blood pressure readings have been (she can schedule a nurse only visit for this) and we'll want to check labs to ensure her electrolytes and kidney tests are stable since the dose increase.     I will be out of the office for the next 2 weeks, I suggest she start this evisit in July.     Sydney Nair PA-C

## 2024-06-13 NOTE — TELEPHONE ENCOUNTER
Called patient with assistance of [facility]  and left a message to return our call to the clinic.    Sydney Ridley RN

## 2024-06-14 ENCOUNTER — PATIENT OUTREACH (OUTPATIENT)
Dept: CARE COORDINATION | Facility: CLINIC | Age: 49
End: 2024-06-14

## 2024-06-14 NOTE — PROGRESS NOTES
Clinic Care Coordination Contact  [facility]/Voicemail      Clinical Data: CHW Outreach    Outreach attempted x 1 regarding CCC enrollment.  Left message on patient's voicemail with call back information and requested return call.    Plan: CHW will attempt another outreach to the patient via phone regarding enrollment into CCC in 1-2 business days.    CHICO Billingsley  Clinic Care Coordination   [facility]   Phone: [phone]  [email or web address]

## 2024-06-17 ENCOUNTER — PATIENT OUTREACH (OUTPATIENT)
Dept: CARE COORDINATION | Facility: CLINIC | Age: 49
End: 2024-06-17

## 2024-06-17 ENCOUNTER — APPOINTMENT (OUTPATIENT)
Dept: INTERPRETER SERVICES | Facility: CLINIC | Age: 49
End: 2024-06-17

## 2024-06-17 NOTE — PROGRESS NOTES
[facility]/Voicemail    Clinical Data: Care Coordinator Outreach    Outreach Documentation Number of Outreach Attempt   6/17/2024   1:49 PM 2       Left message on patient's voicemail with call back information and requested return call.    Plan: Care Coordinator will send care coordination introduction letter with care coordinator contact information and explanation of care coordination services via mail. Care Coordinator will do no further outreaches at this time.    Madeline Luis, CHICO  [place], [place], [place] S[facility] [place] and [facility]  [phone]

## 2024-06-17 NOTE — LETTER
[facility]  [address] [place] [address]    June 17, 2024    Parish WILY Larsen  [address]  [place] [address]      Nyob zoo Song,    Kuv yog ib tugtxhim tsa car saib xyuas ntawm qhov chaw sergio mob uas ua hauj lwm nrog Sindt, Sydneyjesus Velasco   arvind [place] [facility]. Kuv mandie ntawv tuaj qhia koj paub txog peb txoj titus num uas yog txhim tsa car saib xyuas monica hauv qhov chaw sergio mob thiab peb tseem pab txhawb nqa koj tau kom ncav cuag koj elier aspen phiaj ntawm car sergio mob.    Peb pab pawg txhim tsa car saib xyuas monica qhov chaw sergio mob muaj neeg xws li ib tug nais maum txawj, ib tug kws pab pej xeem, thiab ib tug neeg sergio mob hauv zej zog. Lub aspen phiaj monica peb pab pawg no yog pab koj noj qab nyob zoo thiab pab kom thiaj yooj markel monica koj txais tau car sergio mob. Peb pab pawg yuav pab koj ncav cuag koj elier aspen phiaj ntawm car sergio mob dhau ntawm muab ntaub ntawv ntawm car noj qab nyob zoo monica koj, txhim tsa car pab, ntxiv zog monica car tiv tauj ntawm koj thiab koj tus kws sergio mob, thiab txhawb nqa koj yog koj yuav tsum muaj dab tsi.    Thov tiv tauj tuaj monica Madeline jorge [phone] yog koj muaj yoli nug los sis car txhawj xeeb dab tsi. Peb ua tib zoo siv zog muab car sergio mob zoo tshaj plaws monica koj. Peb xav pab koj ncav cuag thiab ua neej nyob raws li koj elier aspen phiaj ntawm car sergio mob.    Mandie leal,     Madeline Luis, CHICO Abdullahi, [place] Corona, [place] [facility] [place] and [facility]  [phone]

## 2024-06-21 NOTE — TELEPHONE ENCOUNTER
RN left message to return call to clinic [phone].  (RN did not leave specific details on voicemail for confidential reasons)    Maira Granado RN on 6/21/2024 at 8:20 AM

## 2024-06-27 NOTE — TELEPHONE ENCOUNTER
Unable to reach patient, please mail a letter to the patient to contact the clinic.     Thank you,  Sydney Ridley RN

## 2024-10-08 ENCOUNTER — TELEPHONE (OUTPATIENT)
Dept: FAMILY MEDICINE | Facility: CLINIC | Age: 49
End: 2024-10-08

## 2024-10-08 NOTE — TELEPHONE ENCOUNTER
Patient Quality Outreach    Patient is due for the following:   Physical Preventive Adult Physical    Next Steps:   Schedule a Adult Preventative    Type of outreach:    Sent letter.      Questions for provider review:    None           Kori Payne MA  Chart routed to Care Team.

## 2024-10-08 NOTE — LETTER
October 8, 2024    To  Parish Larsen  [address] [facility] [address]    Your team at [facility] cares about your health. We have reviewed your chart and based on our findings; we are making the following recommendations to better manage your health.     You are in particular need of attention regarding the following:     PREVENTATIVE VISIT: Physical     If you have already completed these items, please contact the clinic via phone or   MyChart so your care team can review and update your records. Thank you for   choosing [facility]ics for your healthcare needs. For any questions,   concerns, or to schedule an appointment please contact our clinic.    Healthy Regards,      Your [facility] Care Team

## 2025-03-13 ENCOUNTER — TELEPHONE (OUTPATIENT)
Dept: FAMILY MEDICINE | Facility: CLINIC | Age: 50
End: 2025-03-13

## 2025-03-13 DIAGNOSIS — I10 HYPERTENSION GOAL BP (BLOOD PRESSURE) < 140/90: Primary | ICD-10-CM

## 2025-03-13 NOTE — TELEPHONE ENCOUNTER
Parish comes into clinic with her spouse Sreekanth.  She states she lost her job and needs assistance finding health insurance.  She is interested in referral to care coordinator.    She can only take calls on Friday or after 4 pm.     Sydney Nair PA-C

## 2025-03-13 NOTE — TELEPHONE ENCOUNTER
Patient Quality Outreach    Patient is due for the following:   Physical Preventive Adult Physical    Action(s) Taken:   Schedule a Adult Preventative    Type of outreach:    Sent letter.    Questions for provider review:    None           Kori Payne MA  Chart routed to Care Team.

## 2025-03-13 NOTE — LETTER
March 13, 2025    To  Parish Larsen  [address]  [facility] [address]      Your team at [facility] cares about your health. We have reviewed your chart and based on our findings; we are making the following recommendations to better manage your health.     You are in particular need of attention regarding the following:     PREVENTATIVE VISIT: Physical    If you have already completed these items, please contact the clinic via phone or   MyChart so your care team can review and update your records. Thank you for   choosing [facility] Clinics for your healthcare needs. For any questions,   concerns, or to schedule an appointment please contact our clinic.    Healthy Regards,      Your [facility] Care Team

## 2025-03-14 ENCOUNTER — PATIENT OUTREACH (OUTPATIENT)
Dept: CARE COORDINATION | Facility: CLINIC | Age: 50
End: 2025-03-14

## 2025-03-14 NOTE — LETTER
[facility]ORD[facility]  [address]  [place] [address]    March 17, 2025    Parish Larsen  [address]  [place] [address]      Dear Parish,    I am a clinic community health worker who works with Sydney Nair PA-C with the [facility]. I have been trying to reach you recently to introduce Clinic Care Coordination. Below is a description of clinic care coordination and how I can further assist you.       The clinic care coordination team is made up of a registered nurse, , financial resource worker and community health worker who understand the health care system. The goal of clinic care coordination is to help you manage your health and improve access to the health care system. Our team works alongside your provider to assist you in determining your health and social needs. We can help you obtain health care and community resources, providing you with necessary information and education. We can work with you through any barriers and develop a care plan that helps coordinate and strengthen the communication between you and your care team.  Our services are voluntary and are offered without charge to you personally.    Please feel free to contact me with any questions or concerns regarding care coordination and what we can offer.      We are focused on providing you with the highest-quality healthcare experience possible.    Sincerely,     CHICO Causey, Apolonia Corona, [place] [facility] [place] and [facility]  [phone]

## 2025-03-14 NOTE — PROGRESS NOTES
[facility]/Voicemail    Clinical Data: Care Coordinator Outreach    Outreach Documentation Number of Outreach Attempt   3/14/2025   9:36 AM 1       Left message on patient's spouse's voicemail with call back information and requested return call.      Plan:   Care Coordinator will try to reach patient again in 1-2 business days.    CHICO Causey  [place], [place], [place] [facility] and [facility]  [phone]

## 2025-03-17 NOTE — PROGRESS NOTES
[facility]/Voicemail    Clinical Data: Care Coordinator Outreach    Outreach Documentation Number of Outreach Attempt   3/14/2025   9:36 AM 1   3/17/2025  10:22 AM 2       Left message on patient's voicemail with call back information and requested return call.      Plan: Care Coordinator will send care coordination introduction letter with care coordinator contact information and explanation of care coordination services via mail. Care Coordinator will do no further outreaches at this time.    CHICO Causey, Apolonia Corona, [place] [facility] and [facility]  [phone]